# Patient Record
Sex: FEMALE | Race: WHITE | NOT HISPANIC OR LATINO | Employment: OTHER | URBAN - METROPOLITAN AREA
[De-identification: names, ages, dates, MRNs, and addresses within clinical notes are randomized per-mention and may not be internally consistent; named-entity substitution may affect disease eponyms.]

---

## 2023-08-31 PROBLEM — R01.1 MURMUR: Status: ACTIVE | Noted: 2023-08-31

## 2023-08-31 PROBLEM — S29.012A STRAIN OF THORACIC BACK REGION: Status: ACTIVE | Noted: 2023-08-31

## 2023-08-31 PROBLEM — E53.8 VITAMIN B 12 DEFICIENCY: Status: ACTIVE | Noted: 2023-08-31

## 2023-08-31 PROBLEM — E78.2 MIXED HYPERLIPIDEMIA: Status: ACTIVE | Noted: 2023-08-31

## 2023-08-31 PROBLEM — S22.41XA MULTIPLE CLOSED FRACTURES OF RIBS OF RIGHT SIDE: Status: ACTIVE | Noted: 2023-08-31

## 2023-08-31 PROBLEM — E55.9 VITAMIN D DEFICIENCY: Status: ACTIVE | Noted: 2023-08-31

## 2023-08-31 PROBLEM — M17.11 PRIMARY OSTEOARTHRITIS OF RIGHT KNEE: Status: ACTIVE | Noted: 2023-08-31

## 2023-08-31 PROBLEM — F51.01 PRIMARY INSOMNIA: Status: ACTIVE | Noted: 2023-08-31
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
## 2023-08-31 PROBLEM — I10 ESSENTIAL HYPERTENSION: Status: ACTIVE | Noted: 2023-08-31

## 2023-08-31 PROBLEM — Z78.9 MEDICAL HOME PATIENT: Status: ACTIVE | Noted: 2023-08-31

## 2023-08-31 PROBLEM — S72.132D: Status: ACTIVE | Noted: 2023-08-31

## 2023-08-31 PROBLEM — H93.12 TINNITUS OF LEFT EAR: Status: ACTIVE | Noted: 2023-08-31

## 2023-08-31 PROBLEM — M85.852 OSTEOPENIA OF LEFT THIGH: Status: ACTIVE | Noted: 2023-08-31

## 2023-08-31 PROBLEM — N95.1 MENOPAUSAL SYNDROME: Status: ACTIVE | Noted: 2023-08-31

## 2023-08-31 PROBLEM — S42.213A CLOSED FRACTURE OF SURGICAL NECK OF HUMERUS: Status: ACTIVE | Noted: 2023-08-31

## 2023-08-31 PROBLEM — I35.9 AORTIC VALVE CALCIFICATION: Status: ACTIVE | Noted: 2023-08-31

## 2023-08-31 PROBLEM — S69.90XA INJURY OF WRIST: Status: ACTIVE | Noted: 2023-08-31

## 2023-08-31 PROBLEM — M80.00XA AGE-RELATED OSTEOPOROSIS WITH CURRENT PATHOLOGICAL FRACTURE: Status: ACTIVE | Noted: 2023-08-31

## 2023-08-31 PROBLEM — F32.9 REACTIVE DEPRESSION: Status: ACTIVE | Noted: 2023-08-31

## 2023-08-31 PROBLEM — I35.9 AORTIC VALVE DISORDER: Status: ACTIVE | Noted: 2023-08-31

## 2023-08-31 PROBLEM — D47.1 MYELOPROLIFERATIVE DISORDER (MULTI): Status: ACTIVE | Noted: 2023-08-31

## 2023-08-31 PROBLEM — M89.9 DISORDER OF BONE, UNSPECIFIED: Status: ACTIVE | Noted: 2023-08-31

## 2023-08-31 RX ORDER — ATORVASTATIN CALCIUM 10 MG/1
10 TABLET, FILM COATED ORAL DAILY
COMMUNITY
End: 2023-12-04

## 2023-08-31 RX ORDER — ESZOPICLONE 2 MG/1
2 TABLET, FILM COATED ORAL NIGHTLY
COMMUNITY
Start: 2023-02-03

## 2023-08-31 RX ORDER — ATENOLOL 25 MG/1
25 TABLET ORAL DAILY
COMMUNITY
End: 2024-02-27 | Stop reason: SDUPTHER

## 2023-08-31 RX ORDER — ASPIRIN 81 MG/1
81 TABLET ORAL DAILY
COMMUNITY

## 2023-08-31 RX ORDER — ASCORBIC ACID 500 MG
500 TABLET ORAL DAILY
COMMUNITY

## 2023-08-31 RX ORDER — VIT C/E/ZN/COPPR/LUTEIN/ZEAXAN 250MG-90MG
25 CAPSULE ORAL DAILY
COMMUNITY

## 2023-08-31 RX ORDER — ESCITALOPRAM OXALATE 20 MG/1
20 TABLET ORAL DAILY
COMMUNITY
Start: 2023-04-04

## 2023-08-31 RX ORDER — HYDROXYUREA 500 MG/1
500 CAPSULE ORAL DAILY
COMMUNITY
End: 2023-12-08 | Stop reason: SDUPTHER

## 2023-08-31 RX ORDER — TERIPARATIDE 250 UG/ML
INJECTION, SOLUTION SUBCUTANEOUS DAILY
COMMUNITY
Start: 2023-04-04 | End: 2023-11-13 | Stop reason: ALTCHOICE

## 2023-08-31 RX ORDER — LANOLIN ALCOHOL/MO/W.PET/CERES
1000 CREAM (GRAM) TOPICAL DAILY
COMMUNITY

## 2023-09-02 RX ORDER — GABAPENTIN 100 MG/1
CAPSULE ORAL
COMMUNITY
Start: 2023-08-22

## 2023-09-05 ENCOUNTER — HOSPITAL ENCOUNTER (OUTPATIENT)
Dept: DATA CONVERSION | Facility: HOSPITAL | Age: 73
End: 2023-09-05

## 2023-09-05 ENCOUNTER — HOSPITAL ENCOUNTER (OUTPATIENT)
Dept: DATA CONVERSION | Facility: HOSPITAL | Age: 73
Discharge: HOME | End: 2023-09-05
Payer: MEDICARE

## 2023-09-05 DIAGNOSIS — Q25.3 SUPRAVALVULAR AORTIC STENOSIS (HHS-HCC): ICD-10-CM

## 2023-09-05 DIAGNOSIS — Z12.31 ENCOUNTER FOR SCREENING MAMMOGRAM FOR MALIGNANT NEOPLASM OF BREAST: ICD-10-CM

## 2023-09-05 DIAGNOSIS — M80.00XS AGE-RELATED OSTEOPOROSIS WITH CURRENT PATHOLOGICAL FRACTURE, UNSPECIFIED SITE, SEQUELA: ICD-10-CM

## 2023-11-13 ENCOUNTER — APPOINTMENT (OUTPATIENT)
Dept: LAB | Facility: CLINIC | Age: 73
End: 2023-11-13
Payer: MEDICARE

## 2023-11-13 ENCOUNTER — OFFICE VISIT (OUTPATIENT)
Dept: HEMATOLOGY/ONCOLOGY | Facility: CLINIC | Age: 73
End: 2023-11-13
Payer: MEDICARE

## 2023-11-13 VITALS
DIASTOLIC BLOOD PRESSURE: 67 MMHG | SYSTOLIC BLOOD PRESSURE: 124 MMHG | BODY MASS INDEX: 24.71 KG/M2 | OXYGEN SATURATION: 95 % | WEIGHT: 125.88 LBS | HEART RATE: 72 BPM | TEMPERATURE: 95.9 F | RESPIRATION RATE: 17 BRPM | HEIGHT: 60 IN

## 2023-11-13 DIAGNOSIS — D47.3 ESSENTIAL THROMBOCYTOSIS (MULTI): Primary | ICD-10-CM

## 2023-11-13 DIAGNOSIS — E53.8 B12 DEFICIENCY: ICD-10-CM

## 2023-11-13 LAB
ALBUMIN SERPL BCP-MCNC: 4.3 G/DL (ref 3.4–5)
ALP SERPL-CCNC: 42 U/L (ref 33–136)
ALT SERPL W P-5'-P-CCNC: 8 U/L (ref 7–45)
ANION GAP SERPL CALC-SCNC: 13 MMOL/L (ref 10–20)
AST SERPL W P-5'-P-CCNC: 13 U/L (ref 9–39)
BASOPHILS # BLD AUTO: 0.05 X10*3/UL (ref 0–0.1)
BASOPHILS NFR BLD AUTO: 1.4 %
BILIRUB SERPL-MCNC: 0.6 MG/DL (ref 0–1.2)
BUN SERPL-MCNC: 18 MG/DL (ref 6–23)
CALCIUM SERPL-MCNC: 9.4 MG/DL (ref 8.6–10.3)
CHLORIDE SERPL-SCNC: 104 MMOL/L (ref 98–107)
CO2 SERPL-SCNC: 28 MMOL/L (ref 21–32)
CREAT SERPL-MCNC: 0.79 MG/DL (ref 0.5–1.05)
EOSINOPHIL # BLD AUTO: 0.13 X10*3/UL (ref 0–0.4)
EOSINOPHIL NFR BLD AUTO: 3.6 %
ERYTHROCYTE [DISTWIDTH] IN BLOOD BY AUTOMATED COUNT: 15.4 % (ref 11.5–14.5)
GFR SERPL CREATININE-BSD FRML MDRD: 79 ML/MIN/1.73M*2
GLUCOSE SERPL-MCNC: 93 MG/DL (ref 74–99)
HCT VFR BLD AUTO: 37.7 % (ref 36–46)
HGB BLD-MCNC: 12.3 G/DL (ref 12–16)
IMM GRANULOCYTES # BLD AUTO: 0.05 X10*3/UL (ref 0–0.5)
IMM GRANULOCYTES NFR BLD AUTO: 1.4 % (ref 0–0.9)
LYMPHOCYTES # BLD AUTO: 1.18 X10*3/UL (ref 0.8–3)
LYMPHOCYTES NFR BLD AUTO: 33.1 %
MCH RBC QN AUTO: 35.1 PG (ref 26–34)
MCHC RBC AUTO-ENTMCNC: 32.6 G/DL (ref 32–36)
MCV RBC AUTO: 108 FL (ref 80–100)
MONOCYTES # BLD AUTO: 0.29 X10*3/UL (ref 0.05–0.8)
MONOCYTES NFR BLD AUTO: 8.1 %
NEUTROPHILS # BLD AUTO: 1.87 X10*3/UL (ref 1.6–5.5)
NEUTROPHILS NFR BLD AUTO: 52.4 %
NRBC BLD-RTO: 0 /100 WBCS (ref 0–0)
PLATELET # BLD AUTO: 417 X10*3/UL (ref 150–450)
POTASSIUM SERPL-SCNC: 3.8 MMOL/L (ref 3.5–5.3)
PROT SERPL-MCNC: 6.7 G/DL (ref 6.4–8.2)
RBC # BLD AUTO: 3.5 X10*6/UL (ref 4–5.2)
SODIUM SERPL-SCNC: 141 MMOL/L (ref 136–145)
VIT B12 SERPL-MCNC: 1117 PG/ML (ref 211–911)
WBC # BLD AUTO: 3.6 X10*3/UL (ref 4.4–11.3)

## 2023-11-13 PROCEDURE — 99213 OFFICE O/P EST LOW 20 MIN: CPT | Performed by: NURSE PRACTITIONER

## 2023-11-13 PROCEDURE — 82607 VITAMIN B-12: CPT | Performed by: NURSE PRACTITIONER

## 2023-11-13 PROCEDURE — 3074F SYST BP LT 130 MM HG: CPT | Performed by: NURSE PRACTITIONER

## 2023-11-13 PROCEDURE — 36415 COLL VENOUS BLD VENIPUNCTURE: CPT | Performed by: NURSE PRACTITIONER

## 2023-11-13 PROCEDURE — 1160F RVW MEDS BY RX/DR IN RCRD: CPT | Performed by: NURSE PRACTITIONER

## 2023-11-13 PROCEDURE — 85025 COMPLETE CBC W/AUTO DIFF WBC: CPT | Performed by: NURSE PRACTITIONER

## 2023-11-13 PROCEDURE — 1126F AMNT PAIN NOTED NONE PRSNT: CPT | Performed by: NURSE PRACTITIONER

## 2023-11-13 PROCEDURE — 3078F DIAST BP <80 MM HG: CPT | Performed by: NURSE PRACTITIONER

## 2023-11-13 PROCEDURE — 1036F TOBACCO NON-USER: CPT | Performed by: NURSE PRACTITIONER

## 2023-11-13 PROCEDURE — 1159F MED LIST DOCD IN RCRD: CPT | Performed by: NURSE PRACTITIONER

## 2023-11-13 PROCEDURE — 80053 COMPREHEN METABOLIC PANEL: CPT | Performed by: NURSE PRACTITIONER

## 2023-11-13 ASSESSMENT — PATIENT HEALTH QUESTIONNAIRE - PHQ9
2. FEELING DOWN, DEPRESSED OR HOPELESS: NOT AT ALL
1. LITTLE INTEREST OR PLEASURE IN DOING THINGS: NOT AT ALL
SUM OF ALL RESPONSES TO PHQ9 QUESTIONS 1 AND 2: 0

## 2023-11-13 ASSESSMENT — ENCOUNTER SYMPTOMS
LOSS OF SENSATION IN FEET: 0
OCCASIONAL FEELINGS OF UNSTEADINESS: 0
DEPRESSION: 0

## 2023-11-13 ASSESSMENT — PAIN SCALES - GENERAL: PAINLEVEL: 0-NO PAIN

## 2023-11-13 ASSESSMENT — COLUMBIA-SUICIDE SEVERITY RATING SCALE - C-SSRS
2. HAVE YOU ACTUALLY HAD ANY THOUGHTS OF KILLING YOURSELF?: NO
1. IN THE PAST MONTH, HAVE YOU WISHED YOU WERE DEAD OR WISHED YOU COULD GO TO SLEEP AND NOT WAKE UP?: NO
6. HAVE YOU EVER DONE ANYTHING, STARTED TO DO ANYTHING, OR PREPARED TO DO ANYTHING TO END YOUR LIFE?: NO

## 2023-11-13 NOTE — PROGRESS NOTES
"Patient ID: Victoria Trotter is a 73 y.o. female.    History of Present Illness:  Patient returns today for routine follow up evaluation for history of essential thrombocytosis diagnosed by Vipul Ray M.D., bone marrow biopsy in , JAK2 gene mutation negative, currently well controlled with hydroxyurea.   Macrocytosis with history of vitamin B12 deficiency. Remains on oral vitamin B12. Had previously been on monthly B12 injections in our clinic.   Patient remains on hydroxyurea 500 mg daily. Although she has been out of medication for the past week.   This is overall kept her counts stable.  She has not had any fever, chills or night sweats.  No cough, chest pain or shortness of breath.  No nausea or vomiting.  No constipation or diarrhea. No VTE. No signs or symptoms of active bleeding or unusual bruising.     Fall in 2022. Resulting in broken left femur she underwent edgardo and stabilizer placement.    Imaging also noted instability of right femur and edgardo was placed in right femur as well.  She spent 2 weeks in rehabilitation at [facility].     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  She is also caring for her  with COPD status post MI x2.    Vital Signs:  /67 (BP Location: Right arm, Patient Position: Sitting, BP Cuff Size: Adult)   Pulse 72   Temp 35.5 °C (95.9 °F) (Temporal)   Resp 17   Ht 1.513 m (4' 11.57\")   Wt 57.1 kg (125 lb 14.1 oz)   SpO2 95%   BMI 24.94 kg/m²     Physical Exam:  ECO  Pain: 1 (chronic joint pain)  Constitutional: Well developed, awake/alert/oriented x3, no distress, alert and cooperative  Eyes: PER. sclera anicteric  ENMT: Oral mucosa " moist  Respiratory/Thorax: Breathing is non-labored.   Cardiovascular: S1-S2. Regular rate and rhythm. No murmurs, rubs, or gallops appreciated  Musculoskeletal: ROM intact, no joint swelling, normal strength  Extremities: normal extremities, no cyanosis, no edema, no clubbing  Neurologic: alert and oriented x3. Nonfocal exam. No myoclonus  Psychological: Pleasant, appropriate and easily engaged     Labs:  CBC date/time       WBC     HGB     HCT     PLT     Neut      10-May-2023 08:59   4.7     12.4    37.7    510(H)  2.48  14-Nov-2022 08:59   4.6     12.7    38.4    482(H)  2.64    November 13, 2023  WBC 3.6, hemoglobin 12.3, hematocrit 37.7, platelets 417,000    Assessment:  Myeloproliferative disorder (essential thrombocytosis) :  Patient will continue hydroxyurea 500 mg daily and aspirin 81 mg daily    Vitamin B12 deficiency:   She will continue to take oral B12.    Immune prophylaxis:   The patient will continue with the flu shot every fall and the Pneumovax every five years.  She is planning to get her shingles vaccine. She has gotten her COVID booster.     Plan:  - labs 3 months and 6 months   - 6 month follow-up visit  - CBCd 3 months (following WBC as slightly low today)  - CBCd, CMP and LDH    COCO Camarillo-CNP

## 2023-11-13 NOTE — PROGRESS NOTES
Patient here for follow up with Stan Colbert.     Medications and allergies reviewed.     Patient will need refill on hydrea today.     No complaints of pain at this time.

## 2023-12-02 DIAGNOSIS — E78.2 MIXED HYPERLIPIDEMIA: Primary | ICD-10-CM

## 2023-12-04 RX ORDER — ATORVASTATIN CALCIUM 10 MG/1
10 TABLET, FILM COATED ORAL DAILY
Qty: 100 TABLET | Refills: 1 | Status: SHIPPED | OUTPATIENT
Start: 2023-12-04 | End: 2024-05-20

## 2023-12-08 ENCOUNTER — TELEPHONE (OUTPATIENT)
Dept: HEMATOLOGY/ONCOLOGY | Facility: CLINIC | Age: 73
End: 2023-12-08
Payer: MEDICARE

## 2023-12-08 ENCOUNTER — TELEPHONE (OUTPATIENT)
Dept: HEMATOLOGY/ONCOLOGY | Facility: HOSPITAL | Age: 73
End: 2023-12-08
Payer: MEDICARE

## 2023-12-08 DIAGNOSIS — D47.3 ESSENTIAL THROMBOCYTOSIS (MULTI): Primary | ICD-10-CM

## 2023-12-08 RX ORDER — HYDROXYUREA 500 MG/1
500 CAPSULE ORAL DAILY
Qty: 90 CAPSULE | Refills: 3 | Status: SHIPPED | OUTPATIENT
Start: 2023-12-08 | End: 2024-05-07 | Stop reason: SDUPTHER

## 2023-12-08 NOTE — TELEPHONE ENCOUNTER
Refill request received for Hydroxyurea.  This is a pt seen at [facility] By Stan Colbert CNP.  Forwarded message to [facility].

## 2023-12-11 ENCOUNTER — APPOINTMENT (OUTPATIENT)
Dept: PRIMARY CARE | Facility: CLINIC | Age: 73
End: 2023-12-11
Payer: MEDICARE

## 2023-12-20 ENCOUNTER — APPOINTMENT (OUTPATIENT)
Dept: PRIMARY CARE | Facility: CLINIC | Age: 73
End: 2023-12-20
Payer: MEDICARE

## 2024-01-03 ENCOUNTER — HOSPITAL ENCOUNTER (OUTPATIENT)
Dept: CARDIOLOGY | Facility: HOSPITAL | Age: 74
Discharge: HOME | End: 2024-01-03
Payer: MEDICARE

## 2024-01-03 DIAGNOSIS — R01.1 CARDIAC MURMUR, UNSPECIFIED: ICD-10-CM

## 2024-01-03 DIAGNOSIS — I35.0 AORTIC VALVE STENOSIS, ETIOLOGY OF CARDIAC VALVE DISEASE UNSPECIFIED: ICD-10-CM

## 2024-01-03 LAB
AORTIC VALVE MEAN GRADIENT: 25
AORTIC VALVE PEAK VELOCITY: 3.06
AV PEAK GRADIENT: 37.5
AVA (PEAK VEL): 0.82
AVA (VTI): 0.65
EJECTION FRACTION APICAL 4 CHAMBER: 58.3
EJECTION FRACTION: 60
LEFT ATRIUM VOLUME AREA LENGTH INDEX BSA: 21.3
LEFT VENTRICLE INTERNAL DIMENSION DIASTOLE: 4.38 (ref 3.5–6)
LEFT VENTRICULAR OUTFLOW TRACT DIAMETER: 2
MITRAL VALVE E/A RATIO: 0.63
MITRAL VALVE E/E' RATIO: 9.26
RIGHT VENTRICLE FREE WALL PEAK S': 10.2
RIGHT VENTRICLE PEAK SYSTOLIC PRESSURE: 25.1
TRICUSPID ANNULAR PLANE SYSTOLIC EXCURSION: 1.7

## 2024-01-03 PROCEDURE — 93306 TTE W/DOPPLER COMPLETE: CPT

## 2024-01-03 PROCEDURE — 93306 TTE W/DOPPLER COMPLETE: CPT | Performed by: INTERNAL MEDICINE

## 2024-01-15 NOTE — PROGRESS NOTES
Subjective      Chief Complaint   Patient presents with    6 MONTH FOLLOW UP     Aortic stenosis          An echocardiogram was done on 1/3/2024 showed that the aortic valve had moderate stenosis with a peak gradient of 37 mmHg with mean gradient of 25 mmHg. n 2022 the gradients were 37 mmHg and 25 mmHg respectively.  The left ventricular function is normal.  She is feeling well and is active  She is not complaining of chest discomfort.  No complaints of PND or orthopnea.  The legs are not swelling on her.  NO palpitaions.  The BP is doing well  The chol was done in June and is good           ROS     History reviewed. No pertinent surgical history.     Active Ambulatory Problems     Diagnosis Date Noted    Medical home patient 08/31/2023    Accidental fall 08/31/2023    Age-related osteoporosis with current pathological fracture 08/31/2023    Aortic valve calcification 08/31/2023    Aortic valve disorder 08/31/2023    Closed fracture of surgical neck of humerus 08/31/2023    Disorder of bone, unspecified 08/31/2023    Displaced apophyseal fracture of left femur, subsequent encounter for closed fracture with routine healing 08/31/2023    Essential hypertension 08/31/2023    Injury of wrist 08/31/2023    Menopausal syndrome 08/31/2023    Mixed hyperlipidemia 08/31/2023    Multiple closed fractures of ribs of right side 08/31/2023    Murmur 08/31/2023    Myeloproliferative disorder (CMS/HCC) 08/31/2023    Osteopenia of left thigh 08/31/2023    Primary insomnia 08/31/2023    Primary osteoarthritis of right knee 08/31/2023    Reactive depression 08/31/2023    Strain of thoracic back region 08/31/2023    Tinnitus of left ear 08/31/2023    Vitamin B 12 deficiency 08/31/2023    Vitamin D deficiency 08/31/2023     Resolved Ambulatory Problems     Diagnosis Date Noted    No Resolved Ambulatory Problems     Past Medical History:   Diagnosis Date    High cholesterol     Hypertension         Visit Vitals  /79   Pulse 72  "  Wt 58.1 kg (128 lb)   SpO2 97%   BMI 25.36 kg/m²   Smoking Status Former   BSA 1.56 m²        Objective     Cardiovascular:      PMI at left midclavicular line. Normal rate. Regular rhythm. Normal S1. Normal S2.       Murmurs: There is a grade 2/6 harsh systolic murmur.      No gallop.  No click. No rub.   Pulses:     Intact distal pulses.            Lab Review:         Lab Results   Component Value Date    CHOL 188 06/13/2023    CHOL 218 (H) 06/07/2022    CHOL 208 (H) 06/04/2021     Lab Results   Component Value Date    HDL 67 06/13/2023    HDL 68 06/07/2022    HDL 69 06/04/2021     Lab Results   Component Value Date    LDLCALC 93 06/13/2023    LDLCALC 116 06/07/2022    LDLCALC 104 06/04/2021     Lab Results   Component Value Date    TRIG 142 06/13/2023    TRIG 170 (H) 06/07/2022    TRIG 177 (H) 06/04/2021     No components found for: \"CHOLHDL\"     Assessment/Plan     Aortic valve disorder  Is doing well and no angina and no chf.  The echo shows the valve shows has unchanged    Essential hypertension  Is doing well    Mixed hyperlipidemia  Was checked in June and is low     "

## 2024-01-17 ENCOUNTER — OFFICE VISIT (OUTPATIENT)
Dept: CARDIOLOGY | Facility: CLINIC | Age: 74
End: 2024-01-17
Payer: MEDICARE

## 2024-01-17 VITALS
WEIGHT: 128 LBS | OXYGEN SATURATION: 97 % | BODY MASS INDEX: 25.36 KG/M2 | SYSTOLIC BLOOD PRESSURE: 124 MMHG | HEART RATE: 72 BPM | DIASTOLIC BLOOD PRESSURE: 79 MMHG

## 2024-01-17 DIAGNOSIS — I35.9 AORTIC VALVE DISORDER: Primary | ICD-10-CM

## 2024-01-17 DIAGNOSIS — I10 ESSENTIAL HYPERTENSION: ICD-10-CM

## 2024-01-17 DIAGNOSIS — E78.2 MIXED HYPERLIPIDEMIA: ICD-10-CM

## 2024-01-17 PROCEDURE — 99213 OFFICE O/P EST LOW 20 MIN: CPT | Performed by: INTERNAL MEDICINE

## 2024-01-17 PROCEDURE — 3078F DIAST BP <80 MM HG: CPT | Performed by: INTERNAL MEDICINE

## 2024-01-17 PROCEDURE — 3074F SYST BP LT 130 MM HG: CPT | Performed by: INTERNAL MEDICINE

## 2024-01-17 PROCEDURE — 1159F MED LIST DOCD IN RCRD: CPT | Performed by: INTERNAL MEDICINE

## 2024-01-17 PROCEDURE — 1160F RVW MEDS BY RX/DR IN RCRD: CPT | Performed by: INTERNAL MEDICINE

## 2024-01-17 PROCEDURE — 1126F AMNT PAIN NOTED NONE PRSNT: CPT | Performed by: INTERNAL MEDICINE

## 2024-01-17 PROCEDURE — 1036F TOBACCO NON-USER: CPT | Performed by: INTERNAL MEDICINE

## 2024-01-17 ASSESSMENT — ENCOUNTER SYMPTOMS
OCCASIONAL FEELINGS OF UNSTEADINESS: 0
DEPRESSION: 0
LOSS OF SENSATION IN FEET: 0

## 2024-01-17 ASSESSMENT — PATIENT HEALTH QUESTIONNAIRE - PHQ9
SUM OF ALL RESPONSES TO PHQ9 QUESTIONS 1 AND 2: 0
1. LITTLE INTEREST OR PLEASURE IN DOING THINGS: NOT AT ALL
2. FEELING DOWN, DEPRESSED OR HOPELESS: NOT AT ALL

## 2024-01-17 ASSESSMENT — PAIN SCALES - GENERAL: PAINLEVEL: 0-NO PAIN

## 2024-01-19 ENCOUNTER — APPOINTMENT (OUTPATIENT)
Dept: PRIMARY CARE | Facility: CLINIC | Age: 74
End: 2024-01-19
Payer: MEDICARE

## 2024-01-26 ENCOUNTER — OFFICE VISIT (OUTPATIENT)
Dept: PRIMARY CARE | Facility: CLINIC | Age: 74
End: 2024-01-26
Payer: MEDICARE

## 2024-01-26 VITALS
OXYGEN SATURATION: 99 % | SYSTOLIC BLOOD PRESSURE: 118 MMHG | WEIGHT: 124.2 LBS | BODY MASS INDEX: 24.61 KG/M2 | HEART RATE: 64 BPM | DIASTOLIC BLOOD PRESSURE: 80 MMHG

## 2024-01-26 DIAGNOSIS — I10 ESSENTIAL HYPERTENSION: Primary | ICD-10-CM

## 2024-01-26 DIAGNOSIS — F32.9 REACTIVE DEPRESSION: ICD-10-CM

## 2024-01-26 DIAGNOSIS — E78.2 MIXED HYPERLIPIDEMIA: ICD-10-CM

## 2024-01-26 DIAGNOSIS — E53.8 VITAMIN B 12 DEFICIENCY: ICD-10-CM

## 2024-01-26 DIAGNOSIS — E55.9 VITAMIN D DEFICIENCY: ICD-10-CM

## 2024-01-26 PROCEDURE — 1036F TOBACCO NON-USER: CPT

## 2024-01-26 PROCEDURE — 1160F RVW MEDS BY RX/DR IN RCRD: CPT

## 2024-01-26 PROCEDURE — 1157F ADVNC CARE PLAN IN RCRD: CPT

## 2024-01-26 PROCEDURE — 1159F MED LIST DOCD IN RCRD: CPT

## 2024-01-26 PROCEDURE — 99214 OFFICE O/P EST MOD 30 MIN: CPT

## 2024-01-26 PROCEDURE — 1126F AMNT PAIN NOTED NONE PRSNT: CPT

## 2024-01-26 PROCEDURE — 3074F SYST BP LT 130 MM HG: CPT

## 2024-01-26 PROCEDURE — 3079F DIAST BP 80-89 MM HG: CPT

## 2024-01-26 ASSESSMENT — LIFESTYLE VARIABLES
HOW OFTEN DO YOU HAVE SIX OR MORE DRINKS ON ONE OCCASION: NEVER
HOW MANY STANDARD DRINKS CONTAINING ALCOHOL DO YOU HAVE ON A TYPICAL DAY: PATIENT DOES NOT DRINK

## 2024-01-26 ASSESSMENT — PATIENT HEALTH QUESTIONNAIRE - PHQ9
1. LITTLE INTEREST OR PLEASURE IN DOING THINGS: NOT AT ALL
SUM OF ALL RESPONSES TO PHQ9 QUESTIONS 1 AND 2: 0
2. FEELING DOWN, DEPRESSED OR HOPELESS: NOT AT ALL

## 2024-01-26 ASSESSMENT — PAIN SCALES - GENERAL: PAINLEVEL: 0-NO PAIN

## 2024-01-26 ASSESSMENT — ENCOUNTER SYMPTOMS
OCCASIONAL FEELINGS OF UNSTEADINESS: 0
DEPRESSION: 0
LOSS OF SENSATION IN FEET: 0

## 2024-01-26 NOTE — PATIENT INSTRUCTIONS
Decrease intake of saturated fats, fast food, sweets.  Increase intake of fresh fruit fresh vegetables and lean meats.  Increase healthy fats seeds, nuts, olive oil instead of butter.  walk 150 minutes/week for heart health.  Decrease intake of processed foods, fast foods, lunch meat, canned soups, canned veggies.  Increase intake of fresh fruits, veggies, and lean meats. Monitor blood pressure at home, keep a log and bring this with you to your next appointment. Call the office if your blood pressure runs 150/90 or higher consistently.

## 2024-01-26 NOTE — PROGRESS NOTES
Subjective   Patient ID: Victoria Trotter is a 73 y.o. female who presents for Establish Care.    HPI   Patient is here to establish care former CN patient.  She does see Dr. Deluna for injections in her left knee.  She did see Dr. Perry on the 17th due to follow-up with him in 1 year.  She denies any issues with chest pain, chest pressure, shortness of breath, constipation, diarrhea, blood in stool, urinary urgency, frequency, blood in urine, muscle weakness in arms and legs, numbness and tingling in fingers or toes.  She does not monitor blood pressure at home.  She denies any urgent care, ER, hospitalization, new diagnoses or surgeries since she was here last.  Review of Systems  Review of Systems negative except as noted in HPI and Chief complaint.    Current Outpatient Medications:     ascorbic acid (Vitamin C) 500 mg tablet, Take 1 tablet (500 mg) by mouth once daily., Disp: , Rfl:     aspirin (Lo-Dose Aspirin) 81 mg EC tablet, Take 1 tablet (81 mg) by mouth once daily., Disp: , Rfl:     atenolol (Tenormin) 25 mg tablet, Take 1 tablet (25 mg) by mouth once daily., Disp: , Rfl:     atorvastatin (Lipitor) 10 mg tablet, TAKE 1 TABLET BY MOUTH ONCE  DAILY, Disp: 100 tablet, Rfl: 1    cholecalciferol (Vitamin D3) 25 MCG (1000 UT) capsule, Take 1 capsule (25 mcg) by mouth once daily., Disp: , Rfl:     cyanocobalamin (Vitamin B-12) 1,000 mcg tablet, 1 tablet (1,000 mcg) once daily., Disp: , Rfl:     escitalopram (Lexapro) 20 mg tablet, Take 1 tablet (20 mg) by mouth once daily., Disp: , Rfl:     eszopiclone (Lunesta) 2 mg tablet, Take 1 tablet (2 mg) by mouth once daily at bedtime. As needed, Disp: , Rfl:     gabapentin (Neurontin) 100 mg capsule, , Disp: , Rfl:     hydroxyurea (Hydrea) 500 mg capsule, Take 1 capsule (500 mg total) by mouth once daily., Disp: 90 capsule, Rfl: 3      Objective   /80   Pulse 64   Wt 56.3 kg (124 lb 3.2 oz)   SpO2 99%   BMI 24.61 kg/m²     Physical Exam  Vitals reviewed.    Cardiovascular:      Rate and Rhythm: Normal rate and regular rhythm.   Pulmonary:      Effort: Pulmonary effort is normal.   Musculoskeletal:         General: Normal range of motion.   Neurological:      General: No focal deficit present.      Mental Status: She is alert and oriented to person, place, and time. Mental status is at baseline.   Psychiatric:         Mood and Affect: Mood normal.         Behavior: Behavior normal.         Thought Content: Thought content normal.         Judgment: Judgment normal.       Assessment/Plan   Diagnoses and all orders for this visit:  Essential hypertension  Mixed hyperlipidemia  Vitamin B 12 deficiency  Vitamin D deficiency  Reactive depression    Decrease intake of saturated fats, fast food, sweets.  Increase intake of fresh fruit fresh vegetables and lean meats.  Increase healthy fats seeds, nuts, olive oil instead of butter.  walk 150 minutes/week for heart health.  Decrease intake of processed foods, fast foods, lunch meat, canned soups, canned veggies.  Increase intake of fresh fruits, veggies, and lean meats. Monitor blood pressure at home, keep a log and bring this with you to your next appointment. Call the office if your blood pressure runs 150/90 or higher consistently.  Advanced care planning was discussed with Victoria Trotter today. We reviewed code status, Medical Power of , and Living will.   Pt has lw and poa  *This note was dictated using DRAGON speech recognition software and was corrected for spelling or grammatical errors, but despite proofreading several typographical errors might be present that might affect the meaning of the content.*  Mimi Marlow, CNP

## 2024-02-20 ENCOUNTER — TELEPHONE (OUTPATIENT)
Dept: HEMATOLOGY/ONCOLOGY | Facility: CLINIC | Age: 74
End: 2024-02-20

## 2024-02-20 ENCOUNTER — LAB (OUTPATIENT)
Dept: LAB | Facility: LAB | Age: 74
End: 2024-02-20
Payer: MEDICARE

## 2024-02-20 DIAGNOSIS — D47.3 ESSENTIAL THROMBOCYTOSIS (MULTI): ICD-10-CM

## 2024-02-20 LAB
BASOPHILS # BLD MANUAL: 0.09 X10*3/UL (ref 0–0.1)
BASOPHILS NFR BLD MANUAL: 2 %
EOSINOPHIL # BLD MANUAL: 0.23 X10*3/UL (ref 0–0.4)
EOSINOPHIL NFR BLD MANUAL: 5 %
ERYTHROCYTE [DISTWIDTH] IN BLOOD BY AUTOMATED COUNT: 15.6 % (ref 11.5–14.5)
HCT VFR BLD AUTO: 38.6 % (ref 36–46)
HGB BLD-MCNC: 12.4 G/DL (ref 12–16)
IMM GRANULOCYTES # BLD AUTO: 0.07 X10*3/UL (ref 0–0.5)
IMM GRANULOCYTES NFR BLD AUTO: 1.6 % (ref 0–0.9)
LYMPHOCYTES # BLD MANUAL: 1.58 X10*3/UL (ref 0.8–3)
LYMPHOCYTES NFR BLD MANUAL: 35 %
MCH RBC QN AUTO: 34.7 PG (ref 26–34)
MCHC RBC AUTO-ENTMCNC: 32.1 G/DL (ref 32–36)
MCV RBC AUTO: 108 FL (ref 80–100)
METAMYELOCYTES # BLD MANUAL: 0.05 X10*3/UL
METAMYELOCYTES NFR BLD MANUAL: 1 %
MONOCYTES # BLD MANUAL: 0.23 X10*3/UL (ref 0.05–0.8)
MONOCYTES NFR BLD MANUAL: 5 %
NEUTROPHILS # BLD MANUAL: 2.34 X10*3/UL (ref 1.6–5.5)
NEUTS BAND # BLD MANUAL: 0.09 X10*3/UL (ref 0–0.5)
NEUTS BAND NFR BLD MANUAL: 2 %
NEUTS SEG # BLD MANUAL: 2.25 X10*3/UL (ref 1.6–5)
NEUTS SEG NFR BLD MANUAL: 50 %
NRBC BLD-RTO: 0 /100 WBCS (ref 0–0)
OVALOCYTES BLD QL SMEAR: NORMAL
PLATELET # BLD AUTO: 442 X10*3/UL (ref 150–450)
RBC # BLD AUTO: 3.57 X10*6/UL (ref 4–5.2)
RBC MORPH BLD: NORMAL
TOTAL CELLS COUNTED BLD: 100
WBC # BLD AUTO: 4.5 X10*3/UL (ref 4.4–11.3)

## 2024-02-20 PROCEDURE — 85027 COMPLETE CBC AUTOMATED: CPT

## 2024-02-20 PROCEDURE — 36415 COLL VENOUS BLD VENIPUNCTURE: CPT

## 2024-02-20 PROCEDURE — 85007 BL SMEAR W/DIFF WBC COUNT: CPT

## 2024-02-20 NOTE — TELEPHONE ENCOUNTER
Called and left voicemail on identifiable voicemail that Stan saw her blood work and her platelets were 442 and there will be no change to her hydrea dose.

## 2024-02-27 DIAGNOSIS — I10 ESSENTIAL HYPERTENSION: Primary | ICD-10-CM

## 2024-02-27 RX ORDER — ATENOLOL 25 MG/1
25 TABLET ORAL DAILY
Qty: 90 TABLET | Refills: 1 | Status: SHIPPED | OUTPATIENT
Start: 2024-02-27 | End: 2024-05-07

## 2024-05-06 DIAGNOSIS — I10 ESSENTIAL HYPERTENSION: ICD-10-CM

## 2024-05-07 ENCOUNTER — OFFICE VISIT (OUTPATIENT)
Dept: HEMATOLOGY/ONCOLOGY | Facility: CLINIC | Age: 74
End: 2024-05-07
Payer: MEDICARE

## 2024-05-07 ENCOUNTER — APPOINTMENT (OUTPATIENT)
Dept: LAB | Facility: CLINIC | Age: 74
End: 2024-05-07
Payer: MEDICARE

## 2024-05-07 VITALS
TEMPERATURE: 97.3 F | BODY MASS INDEX: 24.65 KG/M2 | SYSTOLIC BLOOD PRESSURE: 104 MMHG | WEIGHT: 125.55 LBS | HEIGHT: 60 IN | HEART RATE: 69 BPM | DIASTOLIC BLOOD PRESSURE: 68 MMHG | RESPIRATION RATE: 18 BRPM | OXYGEN SATURATION: 98 %

## 2024-05-07 DIAGNOSIS — D47.3 ESSENTIAL THROMBOCYTOSIS (MULTI): Primary | ICD-10-CM

## 2024-05-07 DIAGNOSIS — E53.8 B12 DEFICIENCY: ICD-10-CM

## 2024-05-07 LAB
ALBUMIN SERPL BCP-MCNC: 4.4 G/DL (ref 3.4–5)
ALP SERPL-CCNC: 47 U/L (ref 33–136)
ALT SERPL W P-5'-P-CCNC: 8 U/L (ref 7–45)
ANION GAP SERPL CALC-SCNC: 13 MMOL/L (ref 10–20)
AST SERPL W P-5'-P-CCNC: 13 U/L (ref 9–39)
BASO STIPL BLD QL SMEAR: PRESENT
BASOPHILS # BLD MANUAL: 0.13 X10*3/UL (ref 0–0.1)
BASOPHILS NFR BLD MANUAL: 3 %
BILIRUB SERPL-MCNC: 0.5 MG/DL (ref 0–1.2)
BUN SERPL-MCNC: 20 MG/DL (ref 6–23)
CALCIUM SERPL-MCNC: 9.1 MG/DL (ref 8.6–10.3)
CHLORIDE SERPL-SCNC: 105 MMOL/L (ref 98–107)
CO2 SERPL-SCNC: 26 MMOL/L (ref 21–32)
CREAT SERPL-MCNC: 0.7 MG/DL (ref 0.5–1.05)
EGFRCR SERPLBLD CKD-EPI 2021: >90 ML/MIN/1.73M*2
EOSINOPHIL # BLD MANUAL: 0.17 X10*3/UL (ref 0–0.4)
EOSINOPHIL NFR BLD MANUAL: 4 %
ERYTHROCYTE [DISTWIDTH] IN BLOOD BY AUTOMATED COUNT: 16.4 % (ref 11.5–14.5)
GLUCOSE SERPL-MCNC: 93 MG/DL (ref 74–99)
HCT VFR BLD AUTO: 39.9 % (ref 36–46)
HGB BLD-MCNC: 12.7 G/DL (ref 12–16)
HYPOCHROMIA BLD QL SMEAR: ABNORMAL
IMM GRANULOCYTES # BLD AUTO: 0.42 X10*3/UL (ref 0–0.5)
IMM GRANULOCYTES NFR BLD AUTO: 9.9 % (ref 0–0.9)
LYMPHOCYTES # BLD MANUAL: 1.16 X10*3/UL (ref 0.8–3)
LYMPHOCYTES NFR BLD MANUAL: 27 %
MCH RBC QN AUTO: 34.8 PG (ref 26–34)
MCHC RBC AUTO-ENTMCNC: 31.8 G/DL (ref 32–36)
MCV RBC AUTO: 109 FL (ref 80–100)
METAMYELOCYTES # BLD MANUAL: 0.04 X10*3/UL
METAMYELOCYTES NFR BLD MANUAL: 1 %
MONOCYTES # BLD MANUAL: 0.26 X10*3/UL (ref 0.05–0.8)
MONOCYTES NFR BLD MANUAL: 6 %
NEUTS SEG # BLD MANUAL: 2.54 X10*3/UL (ref 1.6–5)
NEUTS SEG NFR BLD MANUAL: 59 %
NRBC BLD-RTO: 0 /100 WBCS (ref 0–0)
OVALOCYTES BLD QL SMEAR: ABNORMAL
PLATELET # BLD AUTO: 466 X10*3/UL (ref 150–450)
POTASSIUM SERPL-SCNC: 4 MMOL/L (ref 3.5–5.3)
PROT SERPL-MCNC: 6.8 G/DL (ref 6.4–8.2)
RBC # BLD AUTO: 3.65 X10*6/UL (ref 4–5.2)
RBC MORPH BLD: ABNORMAL
SCHISTOCYTES BLD QL SMEAR: ABNORMAL
SODIUM SERPL-SCNC: 140 MMOL/L (ref 136–145)
TOTAL CELLS COUNTED BLD: 100
VIT B12 SERPL-MCNC: 453 PG/ML (ref 211–911)
WBC # BLD AUTO: 4.3 X10*3/UL (ref 4.4–11.3)

## 2024-05-07 PROCEDURE — 84075 ASSAY ALKALINE PHOSPHATASE: CPT | Performed by: NURSE PRACTITIONER

## 2024-05-07 PROCEDURE — 3078F DIAST BP <80 MM HG: CPT | Performed by: NURSE PRACTITIONER

## 2024-05-07 PROCEDURE — 99213 OFFICE O/P EST LOW 20 MIN: CPT | Performed by: NURSE PRACTITIONER

## 2024-05-07 PROCEDURE — 85027 COMPLETE CBC AUTOMATED: CPT | Performed by: NURSE PRACTITIONER

## 2024-05-07 PROCEDURE — 1159F MED LIST DOCD IN RCRD: CPT | Performed by: NURSE PRACTITIONER

## 2024-05-07 PROCEDURE — 85007 BL SMEAR W/DIFF WBC COUNT: CPT | Performed by: NURSE PRACTITIONER

## 2024-05-07 PROCEDURE — 82607 VITAMIN B-12: CPT | Performed by: NURSE PRACTITIONER

## 2024-05-07 PROCEDURE — 83615 LACTATE (LD) (LDH) ENZYME: CPT | Performed by: NURSE PRACTITIONER

## 2024-05-07 PROCEDURE — 1160F RVW MEDS BY RX/DR IN RCRD: CPT | Performed by: NURSE PRACTITIONER

## 2024-05-07 PROCEDURE — 36415 COLL VENOUS BLD VENIPUNCTURE: CPT | Performed by: NURSE PRACTITIONER

## 2024-05-07 PROCEDURE — 1126F AMNT PAIN NOTED NONE PRSNT: CPT | Performed by: NURSE PRACTITIONER

## 2024-05-07 PROCEDURE — 1157F ADVNC CARE PLAN IN RCRD: CPT | Performed by: NURSE PRACTITIONER

## 2024-05-07 PROCEDURE — 3074F SYST BP LT 130 MM HG: CPT | Performed by: NURSE PRACTITIONER

## 2024-05-07 RX ORDER — ATENOLOL 25 MG/1
25 TABLET ORAL DAILY
Qty: 100 TABLET | Refills: 2 | Status: SHIPPED | OUTPATIENT
Start: 2024-05-07

## 2024-05-07 RX ORDER — HYDROXYUREA 500 MG/1
500 CAPSULE ORAL DAILY
Qty: 90 CAPSULE | Refills: 3 | Status: SHIPPED | OUTPATIENT
Start: 2024-05-07 | End: 2025-05-07

## 2024-05-07 ASSESSMENT — COLUMBIA-SUICIDE SEVERITY RATING SCALE - C-SSRS
6. HAVE YOU EVER DONE ANYTHING, STARTED TO DO ANYTHING, OR PREPARED TO DO ANYTHING TO END YOUR LIFE?: NO
1. IN THE PAST MONTH, HAVE YOU WISHED YOU WERE DEAD OR WISHED YOU COULD GO TO SLEEP AND NOT WAKE UP?: NO
2. HAVE YOU ACTUALLY HAD ANY THOUGHTS OF KILLING YOURSELF?: NO

## 2024-05-07 ASSESSMENT — PATIENT HEALTH QUESTIONNAIRE - PHQ9
2. FEELING DOWN, DEPRESSED OR HOPELESS: NOT AT ALL
SUM OF ALL RESPONSES TO PHQ9 QUESTIONS 1 AND 2: 0
1. LITTLE INTEREST OR PLEASURE IN DOING THINGS: NOT AT ALL

## 2024-05-07 ASSESSMENT — PAIN SCALES - GENERAL: PAINLEVEL: 0-NO PAIN

## 2024-05-07 NOTE — PROGRESS NOTES
Patient here for follow up for thrombocytosis with Stan Colbert. Patient states that she has felt some fatigue lately and has been having difficulty sleeping. Patient has no other complaints or questions at this time.     Per orders patient to follow up in 6 months, with labs at 3 months and 6 months.     Patient verbalized understanding.

## 2024-05-07 NOTE — PROGRESS NOTES
"Patient ID: Victoria Trotter is a 73 y.o. female.    Hematologic History:  Essential Thrombocytosis, JAK2 negative     History of Present Illness:  Patient returns today for routine follow up evaluation for history of essential thrombocytosis diagnosed by Vipul Ray M.D., bone marrow biopsy in 2004, JAK2 gene mutation negative, currently well controlled with hydroxyurea.   Macrocytosis with history of vitamin B12 deficiency. Remains on oral vitamin B12. Had previously been on monthly B12 injections in our clinic.   Patient remains on hydroxyurea 500 mg daily. Although she has been out of medication for the past week.   This is overall kept her counts stable.  She has not had any fever, chills or night sweats.  No cough, chest pain or shortness of breath.  No nausea or vomiting.  No constipation or diarrhea. No VTE. No signs or symptoms of active bleeding or unusual bruising.     Review of Systems:  A review of systems has been completed and are negative for complaints except what is stated in the HPI and/or past medical history    Allergies:  NKDA    Medications:  Hydrea 500mg daily, vitamin B12 1000mcg daily   Remainder of medication list reviewed with patient and updated in EMR    Past Medical History:  HTN, hyperlipidemia, murmur, vitamin B12 deficiency, essential thrombocytosis    Past Surgical History:  Fall in February 2022. Resulting in broken left femur she underwent edgardo and stabilizer placement.  Imaging also noted instability of right femur and edgardo was placed in right femur as well.     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  She is also caring for her  with COPD status post MI x2.    Vital Signs:  /68 (BP Location: Right arm, Patient Position: Sitting, BP Cuff Size: Small adult)   Pulse 69   Temp 36.3 °C (97.3 °F) (Temporal)   Resp 18   Ht (S) 1.515 m (4' 11.65\")   Wt 56.9 kg (125 lb 8.8 oz)   SpO2 98%   BMI 24.81 kg/m² "       Physical Exam:  ECO  Pain: 0  Constitutional: Well developed, awake/alert/oriented x3, no distress, alert and cooperative  Eyes: PER. sclera anicteric  ENMT: Oral mucosa moist  Respiratory/Thorax: Breathing is non-labored. Lungs clear to ausculation bilaterally   Cardiovascular: S1-S2. Regular rate and rhythm. + murmur, no rubs, or gallops appreciated  Musculoskeletal: ROM intact, no joint swelling, normal strength  Extremities: normal extremities, no cyanosis, no edema, no clubbing  Neurologic: alert and oriented x3. Nonfocal exam. No myoclonus  Psychological: Pleasant, appropriate and easily engaged     Labs:  2023  WBC 3.6, hemoglobin 12.3, hematocrit 37.7, platelets 417,000    February   WBC 4.5, hemoglobin 12.4, hematocrit 38.6, platelets 442,000    May 7, 2024  WBC 4.3, hemoglobin 12.7, hematocrit 39.9, platelets 466,000    Assessment:  Myeloproliferative disorder (essential thrombocytosis) :  Patient will continue hydroxyurea 500 mg daily and aspirin 81 mg daily    Vitamin B12 deficiency:   She will continue to take oral B12.    Immune prophylaxis:   The patient will continue with the flu shot every fall and the Pneumovax every five years.  She is planning to get her shingles vaccine. She has gotten her COVID booster.     Plan:  - 6 month follow-up visit  - - labs 3 months (CBCd) and 6 months (CBCd, CMP and LDH)        Stan Colbert, COCO-CNP

## 2024-05-08 LAB — LDH SERPL L TO P-CCNC: 213 U/L (ref 84–246)

## 2024-05-19 DIAGNOSIS — E78.2 MIXED HYPERLIPIDEMIA: ICD-10-CM

## 2024-05-20 RX ORDER — ATORVASTATIN CALCIUM 10 MG/1
10 TABLET, FILM COATED ORAL DAILY
Qty: 100 TABLET | Refills: 2 | Status: SHIPPED | OUTPATIENT
Start: 2024-05-20

## 2024-06-18 ENCOUNTER — LAB (OUTPATIENT)
Dept: LAB | Facility: LAB | Age: 74
End: 2024-06-18
Payer: MEDICARE

## 2024-06-18 ENCOUNTER — OFFICE VISIT (OUTPATIENT)
Dept: PRIMARY CARE | Facility: CLINIC | Age: 74
End: 2024-06-18
Payer: MEDICARE

## 2024-06-18 VITALS
DIASTOLIC BLOOD PRESSURE: 77 MMHG | OXYGEN SATURATION: 94 % | SYSTOLIC BLOOD PRESSURE: 115 MMHG | HEART RATE: 74 BPM | BODY MASS INDEX: 25.44 KG/M2 | WEIGHT: 126.2 LBS | RESPIRATION RATE: 16 BRPM | HEIGHT: 59 IN

## 2024-06-18 DIAGNOSIS — Z00.00 ROUTINE GENERAL MEDICAL EXAMINATION AT HEALTH CARE FACILITY: ICD-10-CM

## 2024-06-18 DIAGNOSIS — Z12.31 SCREENING MAMMOGRAM FOR BREAST CANCER: ICD-10-CM

## 2024-06-18 DIAGNOSIS — E53.8 VITAMIN B 12 DEFICIENCY: ICD-10-CM

## 2024-06-18 DIAGNOSIS — Z00.00 MEDICARE ANNUAL WELLNESS VISIT, SUBSEQUENT: Primary | ICD-10-CM

## 2024-06-18 DIAGNOSIS — I10 ESSENTIAL HYPERTENSION: ICD-10-CM

## 2024-06-18 DIAGNOSIS — F51.01 PRIMARY INSOMNIA: ICD-10-CM

## 2024-06-18 DIAGNOSIS — E78.2 MIXED HYPERLIPIDEMIA: ICD-10-CM

## 2024-06-18 DIAGNOSIS — D47.1 MYELOPROLIFERATIVE DISORDER (MULTI): ICD-10-CM

## 2024-06-18 DIAGNOSIS — Z13.29 SCREENING FOR THYROID DISORDER: ICD-10-CM

## 2024-06-18 DIAGNOSIS — E55.9 VITAMIN D DEFICIENCY: ICD-10-CM

## 2024-06-18 LAB
25(OH)D3 SERPL-MCNC: 33 NG/ML (ref 31–100)
CHOLEST SERPL-MCNC: 214 MG/DL (ref 133–200)
CHOLEST/HDLC SERPL: 3.2 {RATIO}
HDLC SERPL-MCNC: 66 MG/DL
LDLC SERPL CALC-MCNC: 112 MG/DL (ref 65–130)
TRIGL SERPL-MCNC: 179 MG/DL (ref 40–150)
TSH SERPL DL<=0.05 MIU/L-ACNC: 3.56 MIU/L (ref 0.27–4.2)

## 2024-06-18 PROCEDURE — 36415 COLL VENOUS BLD VENIPUNCTURE: CPT

## 2024-06-18 PROCEDURE — G0439 PPPS, SUBSEQ VISIT: HCPCS

## 2024-06-18 PROCEDURE — 1124F ACP DISCUSS-NO DSCNMKR DOCD: CPT

## 2024-06-18 PROCEDURE — 80061 LIPID PANEL: CPT

## 2024-06-18 PROCEDURE — 82306 VITAMIN D 25 HYDROXY: CPT

## 2024-06-18 PROCEDURE — 3074F SYST BP LT 130 MM HG: CPT

## 2024-06-18 PROCEDURE — 3078F DIAST BP <80 MM HG: CPT

## 2024-06-18 PROCEDURE — 1126F AMNT PAIN NOTED NONE PRSNT: CPT

## 2024-06-18 PROCEDURE — 1157F ADVNC CARE PLAN IN RCRD: CPT

## 2024-06-18 PROCEDURE — 1159F MED LIST DOCD IN RCRD: CPT

## 2024-06-18 PROCEDURE — 84443 ASSAY THYROID STIM HORMONE: CPT

## 2024-06-18 PROCEDURE — 1170F FXNL STATUS ASSESSED: CPT

## 2024-06-18 PROCEDURE — 1036F TOBACCO NON-USER: CPT

## 2024-06-18 PROCEDURE — 99215 OFFICE O/P EST HI 40 MIN: CPT

## 2024-06-18 RX ORDER — ATORVASTATIN CALCIUM 20 MG/1
20 TABLET, FILM COATED ORAL DAILY
Qty: 100 TABLET | Refills: 1 | Status: SHIPPED | OUTPATIENT
Start: 2024-06-18 | End: 2025-01-04

## 2024-06-18 RX ORDER — ESZOPICLONE 2 MG/1
2 TABLET, FILM COATED ORAL NIGHTLY
Qty: 30 TABLET | Refills: 0 | Status: SHIPPED | OUTPATIENT
Start: 2024-06-18

## 2024-06-18 ASSESSMENT — ACTIVITIES OF DAILY LIVING (ADL)
HEARING - LEFT EAR: FUNCTIONAL
USING TELEPHONE: INDEPENDENT
ADEQUATE_TO_COMPLETE_ADL: YES
DRESSING YOURSELF: INDEPENDENT
JUDGMENT_ADEQUATE_SAFELY_COMPLETE_DAILY_ACTIVITIES: YES
BATHING: INDEPENDENT
WALKS IN HOME: INDEPENDENT
TAKING MEDICATION: INDEPENDENT
MANAGING FINANCES: INDEPENDENT
HEARING - RIGHT EAR: FUNCTIONAL
PILL BOX USED: NO
PREPARING MEALS: INDEPENDENT
GROOMING: INDEPENDENT
PATIENT'S MEMORY ADEQUATE TO SAFELY COMPLETE DAILY ACTIVITIES?: YES
TOILETING: INDEPENDENT
NEEDS ASSISTANCE WITH FOOD: INDEPENDENT
STIL DRIVING: YES
EATING: INDEPENDENT
USING TRANSPORTATION: INDEPENDENT
DOING HOUSEWORK: INDEPENDENT
FEEDING YOURSELF: INDEPENDENT
GROCERY SHOPPING: INDEPENDENT

## 2024-06-18 ASSESSMENT — PAIN SCALES - GENERAL: PAINLEVEL: 0-NO PAIN

## 2024-06-18 ASSESSMENT — COLUMBIA-SUICIDE SEVERITY RATING SCALE - C-SSRS
2. HAVE YOU ACTUALLY HAD ANY THOUGHTS OF KILLING YOURSELF?: NO
6. HAVE YOU EVER DONE ANYTHING, STARTED TO DO ANYTHING, OR PREPARED TO DO ANYTHING TO END YOUR LIFE?: NO
1. IN THE PAST MONTH, HAVE YOU WISHED YOU WERE DEAD OR WISHED YOU COULD GO TO SLEEP AND NOT WAKE UP?: NO

## 2024-06-18 ASSESSMENT — ENCOUNTER SYMPTOMS
OCCASIONAL FEELINGS OF UNSTEADINESS: 0
LOSS OF SENSATION IN FEET: 0
DEPRESSION: 0

## 2024-06-18 ASSESSMENT — PATIENT HEALTH QUESTIONNAIRE - PHQ9: 2. FEELING DOWN, DEPRESSED OR HOPELESS: NOT AT ALL

## 2024-06-18 NOTE — PROGRESS NOTES
Subjective   Reason for Visit: Victoria Trotter is an 74 y.o. female here for a Medicare Wellness visit.     Past Medical, Surgical, and Family History reviewed and updated in chart.    Reviewed all medications by prescribing practitioner or clinical pharmacist (such as prescriptions, OTCs, herbal therapies and supplements) and documented in the medical record.    HPI  Patient denies any falls, urgent care, ER, hospitalization, new diagnoses, surgeries in the past year.  Denies any issues with chest pain, chest pressure, shortness of breath, constipation, diarrhea, blood in stool, urinary urgency, frequency, blood in urine, muscle weakness in arms and legs, numbness or tingling in fingers or toes.     would like a refill on Lunesta at this time.   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 Bedtime typically 11, the night before watching bedtime is 9 so it is harder to fall asleep. Discussed referral to sleep medicine to discuss proper treatment for insomnia.      Patient Care Team:  PERLA Torres as PCP - General (Internal Medicine)  PERLA Blackwell as Primary Care Provider     Review of Systems  Review of Systems negative except as noted in HPI and Chief complaint.    Current Outpatient Medications:     ascorbic acid (Vitamin C) 500 mg tablet, Take 1 tablet (500 mg) by mouth once daily., Disp: , Rfl:     aspirin (Lo-Dose Aspirin) 81 mg EC tablet, Take 1 tablet (81 mg) by mouth once daily., Disp: , Rfl:     atenolol (Tenormin) 25 mg tablet, TAKE 1 TABLET BY MOUTH ONCE  DAILY, Disp: 100 tablet, Rfl: 2    atorvastatin (Lipitor) 10 mg tablet, TAKE 1 TABLET BY MOUTH ONCE  DAILY, Disp: 100 tablet, Rfl: 2    cholecalciferol (Vitamin D3) 25 MCG (1000 UT) capsule, Take 1 capsule (25 mcg) by mouth once daily., Disp: , Rfl:     cyanocobalamin (Vitamin B-12) 1,000 mcg tablet, 1 tablet (1,000 mcg) once daily., Disp: , Rfl:     escitalopram (Lexapro) 20 mg tablet, Take 1  "tablet (20 mg) by mouth once daily., Disp: , Rfl:     gabapentin (Neurontin) 100 mg capsule, , Disp: , Rfl:     hydroxyurea (Hydrea) 500 mg capsule, Take 1 capsule (500 mg total) by mouth once daily., Disp: 90 capsule, Rfl: 3    eszopiclone (Lunesta) 2 mg tablet, Take 1 tablet (2 mg) by mouth once daily at bedtime. As needed, Disp: 30 tablet, Rfl: 0    Objective   Vitals:  /77 (BP Location: Left arm, Patient Position: Sitting, BP Cuff Size: Adult)   Pulse 74   Resp 16   Ht 1.499 m (4' 11\")   Wt 57.2 kg (126 lb 3.2 oz)   SpO2 94%   BMI 25.49 kg/m²       Physical Exam  Vitals reviewed.   Constitutional:       General: She is not in acute distress.     Appearance: Normal appearance.   HENT:      Head: Normocephalic.      Right Ear: Tympanic membrane normal.      Left Ear: Tympanic membrane normal.      Nose: Nose normal.      Mouth/Throat:      Mouth: Mucous membranes are moist.      Pharynx: Oropharynx is clear.   Eyes:      Extraocular Movements: Extraocular movements intact.      Conjunctiva/sclera: Conjunctivae normal.      Pupils: Pupils are equal, round, and reactive to light.   Cardiovascular:      Rate and Rhythm: Normal rate.      Pulses: Normal pulses.      Heart sounds: Normal heart sounds.   Pulmonary:      Effort: Pulmonary effort is normal.      Breath sounds: Normal breath sounds.   Abdominal:      General: Abdomen is flat. Bowel sounds are normal.      Palpations: Abdomen is soft.   Musculoskeletal:         General: Normal range of motion.   Skin:     General: Skin is warm and dry.      Capillary Refill: Capillary refill takes 2 to 3 seconds.   Neurological:      General: No focal deficit present.      Mental Status: She is alert and oriented to person, place, and time. Mental status is at baseline.   Psychiatric:         Mood and Affect: Mood normal.         Behavior: Behavior is cooperative.         Assessment/Plan   Problem List Items Addressed This Visit       Essential hypertension    " Mixed hyperlipidemia    Relevant Orders    Lipid Panel    Myeloproliferative disorder (Multi)    Primary insomnia    Relevant Medications    eszopiclone (Lunesta) 2 mg tablet    Other Relevant Orders    Referral to Adult Sleep Medicine    Vitamin B 12 deficiency    Vitamin D deficiency    Relevant Orders    Vitamin D 25-Hydroxy,Total (for eval of Vitamin D levels)     Other Visit Diagnoses       Medicare annual wellness visit, subsequent    -  Primary    Screening for thyroid disorder        Relevant Orders    TSH with reflex to Free T4 if abnormal    Routine general medical examination at health care facility        Screening mammogram for breast cancer        Relevant Orders    BI mammo bilateral screening tomosynthesis        MAMMOGRAM: For breast cancer screening   I have ordered you a mammogram to be done as soon as you are able.  We will call the results.    LAB Order/ BLOOD TESTS   I have ordered lab work for you to get done. This should be fasting. Nothing to eat or drink after midnight besides black tea,  black coffee, or water. If you do not hear from this office within two days of having your labs done, please call for your results.     HYPERLIPIDEMIA:  Decrease intake of saturated fats, fast food, sweets.  Increase intake of fresh fruit fresh vegetables and lean meats.  Increase healthy fats seeds, nuts, olive oil instead of butter.  walk 150 minutes/week for heart health.    HYPERTENSION:   Decrease intake of processed foods, fast foods, lunch meat, canned soups, canned veggies.  Increase intake of fresh fruits, veggies, and lean meats. Monitor blood pressure at home, keep a log and bring this with you to your next appointment. Call the office if your blood pressure runs 150/90 or higher consistently.    Blood Pressure Technique:  Sit quietly in a chair for 5 minutes with back supported and feet on the floor  Then place left arm on a table or armrest so bicep area is at the same level of heart or left  breast  Check three times in a row, disregard the highest reading and average the other two    Advanced care planning was discussed with Victoria Trotter today. We reviewed code status, Medical Power of , and Living will. Pt has LW or POA.   *This note was dictated using DRAGON speech recognition software and was corrected for spelling or grammatical errors, but despite proofreading several typographical errors might be present that might affect the meaning of the content.*  Mimi Marlow, CNP

## 2024-06-19 ENCOUNTER — HOSPITAL ENCOUNTER (OUTPATIENT)
Facility: HOSPITAL | Age: 74
Setting detail: OBSERVATION
LOS: 1 days | Discharge: NURSING, REHAB OR LONG-TERM CARE | End: 2024-06-21
Attending: STUDENT IN AN ORGANIZED HEALTH CARE EDUCATION/TRAINING PROGRAM | Admitting: STUDENT IN AN ORGANIZED HEALTH CARE EDUCATION/TRAINING PROGRAM
Payer: MEDICARE

## 2024-06-19 ENCOUNTER — APPOINTMENT (OUTPATIENT)
Dept: RADIOLOGY | Facility: HOSPITAL | Age: 74
End: 2024-06-19
Payer: MEDICARE

## 2024-06-19 ENCOUNTER — APPOINTMENT (OUTPATIENT)
Dept: CARDIOLOGY | Facility: HOSPITAL | Age: 74
End: 2024-06-19
Payer: MEDICARE

## 2024-06-19 DIAGNOSIS — S32.591A: Primary | ICD-10-CM

## 2024-06-19 DIAGNOSIS — S32.591A CLOSED FRACTURE OF RAMUS OF RIGHT PUBIS, INITIAL ENCOUNTER (MULTI): ICD-10-CM

## 2024-06-19 LAB
ANION GAP SERPL CALC-SCNC: 10 MMOL/L
BASO STIPL BLD QL SMEAR: PRESENT
BASOPHILS # BLD MANUAL: 0.14 X10*3/UL (ref 0–0.1)
BASOPHILS NFR BLD MANUAL: 2 %
BUN SERPL-MCNC: 23 MG/DL (ref 8–25)
CALCIUM SERPL-MCNC: 9.7 MG/DL (ref 8.5–10.4)
CHLORIDE SERPL-SCNC: 103 MMOL/L (ref 97–107)
CO2 SERPL-SCNC: 27 MMOL/L (ref 24–31)
CREAT SERPL-MCNC: 0.9 MG/DL (ref 0.4–1.6)
DACRYOCYTES BLD QL SMEAR: ABNORMAL
EGFRCR SERPLBLD CKD-EPI 2021: 67 ML/MIN/1.73M*2
EOSINOPHIL # BLD MANUAL: 0.35 X10*3/UL (ref 0–0.4)
EOSINOPHIL NFR BLD MANUAL: 5 %
ERYTHROCYTE [DISTWIDTH] IN BLOOD BY AUTOMATED COUNT: 15.6 % (ref 11.5–14.5)
GIANT PLATELETS BLD QL SMEAR: ABNORMAL
GLUCOSE SERPL-MCNC: 106 MG/DL (ref 65–99)
HCT VFR BLD AUTO: 36.2 % (ref 36–46)
HGB BLD-MCNC: 12 G/DL (ref 12–16)
HYPOCHROMIA BLD QL SMEAR: ABNORMAL
IMM GRANULOCYTES # BLD AUTO: 0.56 X10*3/UL (ref 0–0.5)
IMM GRANULOCYTES NFR BLD AUTO: 8 % (ref 0–0.9)
LYMPHOCYTES # BLD MANUAL: 2.1 X10*3/UL (ref 0.8–3)
LYMPHOCYTES NFR BLD MANUAL: 30 %
MCH RBC QN AUTO: 35.5 PG (ref 26–34)
MCHC RBC AUTO-ENTMCNC: 33.1 G/DL (ref 32–36)
MCV RBC AUTO: 107 FL (ref 80–100)
METAMYELOCYTES # BLD MANUAL: 0.14 X10*3/UL
METAMYELOCYTES NFR BLD MANUAL: 2 %
MONOCYTES # BLD MANUAL: 0.35 X10*3/UL (ref 0.05–0.8)
MONOCYTES NFR BLD MANUAL: 5 %
NEUTROPHILS # BLD MANUAL: 3.71 X10*3/UL (ref 1.6–5.5)
NEUTS BAND # BLD MANUAL: 0.21 X10*3/UL (ref 0–0.5)
NEUTS BAND NFR BLD MANUAL: 3 %
NEUTS SEG # BLD MANUAL: 3.5 X10*3/UL (ref 1.6–5)
NEUTS SEG NFR BLD MANUAL: 50 %
NRBC BLD-RTO: 0 /100 WBCS (ref 0–0)
OVALOCYTES BLD QL SMEAR: ABNORMAL
PLATELET # BLD AUTO: 507 X10*3/UL (ref 150–450)
POLYCHROMASIA BLD QL SMEAR: ABNORMAL
POTASSIUM SERPL-SCNC: 4.5 MMOL/L (ref 3.4–5.1)
RBC # BLD AUTO: 3.38 X10*6/UL (ref 4–5.2)
RBC MORPH BLD: ABNORMAL
SCHISTOCYTES BLD QL SMEAR: ABNORMAL
SODIUM SERPL-SCNC: 140 MMOL/L (ref 133–145)
TOTAL CELLS COUNTED BLD: 100
VARIANT LYMPHS # BLD MANUAL: 0.21 X10*3/UL (ref 0–0.3)
VARIANT LYMPHS NFR BLD: 3 %
WBC # BLD AUTO: 7 X10*3/UL (ref 4.4–11.3)

## 2024-06-19 PROCEDURE — 72125 CT NECK SPINE W/O DYE: CPT | Performed by: RADIOLOGY

## 2024-06-19 PROCEDURE — 2500000005 HC RX 250 GENERAL PHARMACY W/O HCPCS: Performed by: STUDENT IN AN ORGANIZED HEALTH CARE EDUCATION/TRAINING PROGRAM

## 2024-06-19 PROCEDURE — 2500000001 HC RX 250 WO HCPCS SELF ADMINISTERED DRUGS (ALT 637 FOR MEDICARE OP): Performed by: STUDENT IN AN ORGANIZED HEALTH CARE EDUCATION/TRAINING PROGRAM

## 2024-06-19 PROCEDURE — 2500000004 HC RX 250 GENERAL PHARMACY W/ HCPCS (ALT 636 FOR OP/ED): Performed by: STUDENT IN AN ORGANIZED HEALTH CARE EDUCATION/TRAINING PROGRAM

## 2024-06-19 PROCEDURE — 70450 CT HEAD/BRAIN W/O DYE: CPT | Performed by: RADIOLOGY

## 2024-06-19 PROCEDURE — 96372 THER/PROPH/DIAG INJ SC/IM: CPT | Performed by: STUDENT IN AN ORGANIZED HEALTH CARE EDUCATION/TRAINING PROGRAM

## 2024-06-19 PROCEDURE — 85007 BL SMEAR W/DIFF WBC COUNT: CPT | Performed by: PHYSICIAN ASSISTANT

## 2024-06-19 PROCEDURE — 73523 X-RAY EXAM HIPS BI 5/> VIEWS: CPT

## 2024-06-19 PROCEDURE — 99285 EMERGENCY DEPT VISIT HI MDM: CPT | Mod: 25

## 2024-06-19 PROCEDURE — 93005 ELECTROCARDIOGRAM TRACING: CPT

## 2024-06-19 PROCEDURE — 36415 COLL VENOUS BLD VENIPUNCTURE: CPT | Performed by: PHYSICIAN ASSISTANT

## 2024-06-19 PROCEDURE — G0378 HOSPITAL OBSERVATION PER HR: HCPCS

## 2024-06-19 PROCEDURE — 85027 COMPLETE CBC AUTOMATED: CPT | Performed by: PHYSICIAN ASSISTANT

## 2024-06-19 PROCEDURE — 70450 CT HEAD/BRAIN W/O DYE: CPT

## 2024-06-19 PROCEDURE — 72125 CT NECK SPINE W/O DYE: CPT

## 2024-06-19 PROCEDURE — 80048 BASIC METABOLIC PNL TOTAL CA: CPT | Performed by: PHYSICIAN ASSISTANT

## 2024-06-19 PROCEDURE — 73523 X-RAY EXAM HIPS BI 5/> VIEWS: CPT | Mod: BILATERAL PROCEDURE | Performed by: RADIOLOGY

## 2024-06-19 RX ORDER — ATENOLOL 25 MG/1
25 TABLET ORAL DAILY
Status: DISCONTINUED | OUTPATIENT
Start: 2024-06-20 | End: 2024-06-21 | Stop reason: HOSPADM

## 2024-06-19 RX ORDER — CHOLECALCIFEROL (VITAMIN D3) 25 MCG
1000 TABLET ORAL DAILY
Status: DISCONTINUED | OUTPATIENT
Start: 2024-06-19 | End: 2024-06-21 | Stop reason: HOSPADM

## 2024-06-19 RX ORDER — FLUTICASONE PROPIONATE 50 MCG
1 SPRAY, SUSPENSION (ML) NASAL DAILY PRN
COMMUNITY

## 2024-06-19 RX ORDER — PSEUDOEPHEDRINE HCL 120 MG/1
120 TABLET, FILM COATED, EXTENDED RELEASE ORAL EVERY 12 HOURS PRN
COMMUNITY
End: 2024-06-21 | Stop reason: HOSPADM

## 2024-06-19 RX ORDER — ASPIRIN 81 MG/1
81 TABLET ORAL DAILY
Status: DISCONTINUED | OUTPATIENT
Start: 2024-06-19 | End: 2024-06-21 | Stop reason: HOSPADM

## 2024-06-19 RX ORDER — ZOLPIDEM TARTRATE 5 MG/1
5 TABLET ORAL NIGHTLY PRN
Status: DISCONTINUED | OUTPATIENT
Start: 2024-06-19 | End: 2024-06-21 | Stop reason: HOSPADM

## 2024-06-19 RX ORDER — LIDOCAINE 560 MG/1
1 PATCH PERCUTANEOUS; TOPICAL; TRANSDERMAL DAILY
Status: DISCONTINUED | OUTPATIENT
Start: 2024-06-19 | End: 2024-06-21 | Stop reason: HOSPADM

## 2024-06-19 RX ORDER — PANTOPRAZOLE SODIUM 40 MG/10ML
40 INJECTION, POWDER, LYOPHILIZED, FOR SOLUTION INTRAVENOUS
Status: DISCONTINUED | OUTPATIENT
Start: 2024-06-19 | End: 2024-06-21 | Stop reason: HOSPADM

## 2024-06-19 RX ORDER — GABAPENTIN 100 MG/1
100 CAPSULE ORAL 2 TIMES DAILY
Status: DISCONTINUED | OUTPATIENT
Start: 2024-06-19 | End: 2024-06-21 | Stop reason: HOSPADM

## 2024-06-19 RX ORDER — HYDROXYUREA 500 MG/1
500 CAPSULE ORAL DAILY
Status: DISCONTINUED | OUTPATIENT
Start: 2024-06-20 | End: 2024-06-21 | Stop reason: HOSPADM

## 2024-06-19 RX ORDER — ASCORBIC ACID 500 MG
500 TABLET ORAL DAILY
Status: DISCONTINUED | OUTPATIENT
Start: 2024-06-19 | End: 2024-06-21 | Stop reason: HOSPADM

## 2024-06-19 RX ORDER — LANOLIN ALCOHOL/MO/W.PET/CERES
1000 CREAM (GRAM) TOPICAL DAILY
Status: DISCONTINUED | OUTPATIENT
Start: 2024-06-20 | End: 2024-06-21 | Stop reason: HOSPADM

## 2024-06-19 RX ORDER — ATORVASTATIN CALCIUM 20 MG/1
20 TABLET, FILM COATED ORAL NIGHTLY
Status: DISCONTINUED | OUTPATIENT
Start: 2024-06-19 | End: 2024-06-21 | Stop reason: HOSPADM

## 2024-06-19 RX ORDER — POLYETHYLENE GLYCOL 3350 17 G/17G
17 POWDER, FOR SOLUTION ORAL DAILY
Status: DISCONTINUED | OUTPATIENT
Start: 2024-06-19 | End: 2024-06-21 | Stop reason: HOSPADM

## 2024-06-19 RX ORDER — OXYCODONE HYDROCHLORIDE 5 MG/1
5 TABLET ORAL EVERY 4 HOURS PRN
Status: DISCONTINUED | OUTPATIENT
Start: 2024-06-19 | End: 2024-06-21 | Stop reason: HOSPADM

## 2024-06-19 RX ORDER — ACETAMINOPHEN 325 MG/1
975 TABLET ORAL ONCE
Status: COMPLETED | OUTPATIENT
Start: 2024-06-19 | End: 2024-06-19

## 2024-06-19 RX ORDER — ESCITALOPRAM OXALATE 20 MG/1
20 TABLET ORAL DAILY
Status: DISCONTINUED | OUTPATIENT
Start: 2024-06-20 | End: 2024-06-21 | Stop reason: HOSPADM

## 2024-06-19 RX ORDER — ENOXAPARIN SODIUM 100 MG/ML
40 INJECTION SUBCUTANEOUS DAILY
Status: DISCONTINUED | OUTPATIENT
Start: 2024-06-19 | End: 2024-06-21 | Stop reason: HOSPADM

## 2024-06-19 RX ORDER — FLUTICASONE PROPIONATE 50 MCG
1 SPRAY, SUSPENSION (ML) NASAL DAILY PRN
Status: DISCONTINUED | OUTPATIENT
Start: 2024-06-19 | End: 2024-06-21 | Stop reason: HOSPADM

## 2024-06-19 RX ORDER — ACETAMINOPHEN 325 MG/1
975 TABLET ORAL EVERY 8 HOURS
Status: DISCONTINUED | OUTPATIENT
Start: 2024-06-19 | End: 2024-06-21 | Stop reason: HOSPADM

## 2024-06-19 RX ORDER — OXYCODONE HYDROCHLORIDE 5 MG/1
10 TABLET ORAL EVERY 4 HOURS PRN
Status: DISCONTINUED | OUTPATIENT
Start: 2024-06-19 | End: 2024-06-21 | Stop reason: HOSPADM

## 2024-06-19 RX ORDER — PANTOPRAZOLE SODIUM 40 MG/1
40 TABLET, DELAYED RELEASE ORAL
Status: DISCONTINUED | OUTPATIENT
Start: 2024-06-19 | End: 2024-06-21 | Stop reason: HOSPADM

## 2024-06-19 RX ORDER — GUAIFENESIN/DEXTROMETHORPHAN 100-10MG/5
5 SYRUP ORAL EVERY 4 HOURS PRN
Status: DISCONTINUED | OUTPATIENT
Start: 2024-06-19 | End: 2024-06-21 | Stop reason: HOSPADM

## 2024-06-19 RX ORDER — GUAIFENESIN 600 MG/1
1200 TABLET, EXTENDED RELEASE ORAL 2 TIMES DAILY PRN
COMMUNITY
End: 2024-06-21 | Stop reason: HOSPADM

## 2024-06-19 RX ORDER — OXYMETAZOLINE HCL 0.05 %
2 SPRAY, NON-AEROSOL (ML) NASAL AS NEEDED
COMMUNITY
End: 2024-06-21 | Stop reason: HOSPADM

## 2024-06-19 SDOH — SOCIAL STABILITY: SOCIAL INSECURITY: DO YOU FEEL ANYONE HAS EXPLOITED OR TAKEN ADVANTAGE OF YOU FINANCIALLY OR OF YOUR PERSONAL PROPERTY?: NO

## 2024-06-19 SDOH — SOCIAL STABILITY: SOCIAL INSECURITY: DOES ANYONE TRY TO KEEP YOU FROM HAVING/CONTACTING OTHER FRIENDS OR DOING THINGS OUTSIDE YOUR HOME?: NO

## 2024-06-19 SDOH — SOCIAL STABILITY: SOCIAL INSECURITY: HAS ANYONE EVER THREATENED TO HURT YOUR FAMILY OR YOUR PETS?: NO

## 2024-06-19 SDOH — SOCIAL STABILITY: SOCIAL INSECURITY: HAVE YOU HAD THOUGHTS OF HARMING ANYONE ELSE?: NO

## 2024-06-19 SDOH — SOCIAL STABILITY: SOCIAL INSECURITY: ABUSE: ADULT

## 2024-06-19 SDOH — SOCIAL STABILITY: SOCIAL INSECURITY: ARE THERE ANY APPARENT SIGNS OF INJURIES/BEHAVIORS THAT COULD BE RELATED TO ABUSE/NEGLECT?: NO

## 2024-06-19 SDOH — SOCIAL STABILITY: SOCIAL INSECURITY: WERE YOU ABLE TO COMPLETE ALL THE BEHAVIORAL HEALTH SCREENINGS?: YES

## 2024-06-19 SDOH — SOCIAL STABILITY: SOCIAL INSECURITY: HAVE YOU HAD ANY THOUGHTS OF HARMING ANYONE ELSE?: NO

## 2024-06-19 SDOH — SOCIAL STABILITY: SOCIAL INSECURITY: ARE YOU OR HAVE YOU BEEN THREATENED OR ABUSED PHYSICALLY, EMOTIONALLY, OR SEXUALLY BY ANYONE?: NO

## 2024-06-19 SDOH — SOCIAL STABILITY: SOCIAL INSECURITY: DO YOU FEEL UNSAFE GOING BACK TO THE PLACE WHERE YOU ARE LIVING?: NO

## 2024-06-19 ASSESSMENT — PAIN DESCRIPTION - DESCRIPTORS
DESCRIPTORS: ACHING
DESCRIPTORS: SHARP
DESCRIPTORS: SHARP

## 2024-06-19 ASSESSMENT — LIFESTYLE VARIABLES
HOW MANY STANDARD DRINKS CONTAINING ALCOHOL DO YOU HAVE ON A TYPICAL DAY: 1 OR 2
EVER HAD A DRINK FIRST THING IN THE MORNING TO STEADY YOUR NERVES TO GET RID OF A HANGOVER: NO
AUDIT-C TOTAL SCORE: 2
TOTAL SCORE: 0
HAVE YOU EVER FELT YOU SHOULD CUT DOWN ON YOUR DRINKING: NO
AUDIT-C TOTAL SCORE: 2
HOW OFTEN DO YOU HAVE A DRINK CONTAINING ALCOHOL: 2-4 TIMES A MONTH
EVER FELT BAD OR GUILTY ABOUT YOUR DRINKING: NO
HAVE PEOPLE ANNOYED YOU BY CRITICIZING YOUR DRINKING: NO
HOW OFTEN DO YOU HAVE 6 OR MORE DRINKS ON ONE OCCASION: NEVER
SKIP TO QUESTIONS 9-10: 1

## 2024-06-19 ASSESSMENT — PAIN - FUNCTIONAL ASSESSMENT
PAIN_FUNCTIONAL_ASSESSMENT: 0-10

## 2024-06-19 ASSESSMENT — ACTIVITIES OF DAILY LIVING (ADL)
BATHING: INDEPENDENT
ASSISTIVE_DEVICE: DENTURES UPPER;EYEGLASSES
PATIENT'S MEMORY ADEQUATE TO SAFELY COMPLETE DAILY ACTIVITIES?: YES
DRESSING YOURSELF: INDEPENDENT
HEARING - RIGHT EAR: FUNCTIONAL
FEEDING YOURSELF: INDEPENDENT
TOILETING: INDEPENDENT
BATHING: INDEPENDENT
LACK_OF_TRANSPORTATION: NO
TOILETING: INDEPENDENT
HEARING - RIGHT EAR: FUNCTIONAL
GROOMING: INDEPENDENT
HEARING - LEFT EAR: FUNCTIONAL
ADEQUATE_TO_COMPLETE_ADL: YES
HEARING - LEFT EAR: FUNCTIONAL
DRESSING YOURSELF: INDEPENDENT
WALKS IN HOME: INDEPENDENT
PATIENT'S MEMORY ADEQUATE TO SAFELY COMPLETE DAILY ACTIVITIES?: YES
GROOMING: INDEPENDENT
ADEQUATE_TO_COMPLETE_ADL: YES
WALKS IN HOME: INDEPENDENT
LACK_OF_TRANSPORTATION: NO
FEEDING YOURSELF: INDEPENDENT
JUDGMENT_ADEQUATE_SAFELY_COMPLETE_DAILY_ACTIVITIES: YES

## 2024-06-19 ASSESSMENT — ENCOUNTER SYMPTOMS
MYALGIAS: 1
DIZZINESS: 0
FEVER: 0
ARTHRALGIAS: 1
SHORTNESS OF BREATH: 0
CONFUSION: 0
NAUSEA: 0
VOMITING: 0
TROUBLE SWALLOWING: 0

## 2024-06-19 ASSESSMENT — PAIN DESCRIPTION - PAIN TYPE
TYPE: ACUTE PAIN
TYPE: ACUTE PAIN

## 2024-06-19 ASSESSMENT — PAIN DESCRIPTION - FREQUENCY: FREQUENCY: CONSTANT/CONTINUOUS

## 2024-06-19 ASSESSMENT — COGNITIVE AND FUNCTIONAL STATUS - GENERAL
PATIENT BASELINE BEDBOUND: NO
MOBILITY SCORE: 24
DAILY ACTIVITIY SCORE: 24

## 2024-06-19 ASSESSMENT — PATIENT HEALTH QUESTIONNAIRE - PHQ9
2. FEELING DOWN, DEPRESSED OR HOPELESS: NOT AT ALL
SUM OF ALL RESPONSES TO PHQ9 QUESTIONS 1 & 2: 0
1. LITTLE INTEREST OR PLEASURE IN DOING THINGS: NOT AT ALL

## 2024-06-19 ASSESSMENT — PAIN SCALES - GENERAL
PAINLEVEL_OUTOF10: 1
PAINLEVEL_OUTOF10: 7
PAINLEVEL_OUTOF10: 1
PAINLEVEL_OUTOF10: 6
PAINLEVEL_OUTOF10: 6
PAINLEVEL_OUTOF10: 7
PAINLEVEL_OUTOF10: 1

## 2024-06-19 ASSESSMENT — PAIN DESCRIPTION - LOCATION
LOCATION: HIP

## 2024-06-19 ASSESSMENT — COLUMBIA-SUICIDE SEVERITY RATING SCALE - C-SSRS
1. IN THE PAST MONTH, HAVE YOU WISHED YOU WERE DEAD OR WISHED YOU COULD GO TO SLEEP AND NOT WAKE UP?: NO
6. HAVE YOU EVER DONE ANYTHING, STARTED TO DO ANYTHING, OR PREPARED TO DO ANYTHING TO END YOUR LIFE?: NO
2. HAVE YOU ACTUALLY HAD ANY THOUGHTS OF KILLING YOURSELF?: NO

## 2024-06-19 ASSESSMENT — PAIN DESCRIPTION - ORIENTATION
ORIENTATION: RIGHT

## 2024-06-19 ASSESSMENT — PAIN DESCRIPTION - PROGRESSION: CLINICAL_PROGRESSION: NOT CHANGED

## 2024-06-19 NOTE — NURSING NOTE
Patient arrived on unit from the ED in stable condition, no sign of acute distress at this time. Patient transferred from the stretcher to the bed, and connected to pure wick and she was able to void with no issues. Due to the movement upon transfer patient does report pain 7/10. Room orientation and safety reviewed with patient and family at bedside. Will continue to assess and provide nursing care.

## 2024-06-19 NOTE — PROGRESS NOTES
Pharmacy Medication History Review    Victoria Trotter is a 74 y.o. female admitted for Fracture of right inferior pubic ramus, closed, initial encounter (Multi). Pharmacy reviewed the patient's rlnwn-tq-ynkvcklod medications and allergies for accuracy.    Medications ADDED:  Afrin  Sinus allergy  Flonase  mucinex  Medications CHANGED:  none  Medications REMOVED:   none     The list below reflects the updated PTA list. Comments regarding how patient may be taking medications differently can be found in the Admit Orders Activity  Prior to Admission Medications   Prescriptions Last Dose Informant   ascorbic acid (Vitamin C) 500 mg tablet Unknown Self   Sig: Take 1 tablet (500 mg) by mouth once daily.   aspirin (Lo-Dose Aspirin) 81 mg EC tablet 2024 Self   Sig: Take 1 tablet (81 mg) by mouth once daily.   atenolol (Tenormin) 25 mg tablet 2024 Self   Sig: TAKE 1 TABLET BY MOUTH ONCE  DAILY   atorvastatin (Lipitor) 20 mg tablet 2024 Self   Sig: Take 1 tablet (20 mg) by mouth once daily.   cholecalciferol (Vitamin D3) 25 MCG (1000 UT) capsule Unknown Self   Sig: Take 1 capsule (25 mcg) by mouth once daily.   cyanocobalamin (Vitamin B-12) 1,000 mcg tablet 2024 Self   Si tablet (1,000 mcg) once daily.   escitalopram (Lexapro) 20 mg tablet 2024 Self   Sig: Take 1 tablet (20 mg) by mouth once daily.   eszopiclone (Lunesta) 2 mg tablet 2024 Self   Sig: Take 1 tablet (2 mg) by mouth once daily at bedtime. As needed   fluticasone (Flonase) 50 mcg/actuation nasal spray Past Week Self   Sig: Administer 1 spray into each nostril once daily as needed for rhinitis. Shake gently. Before first use, prime pump. After use, clean tip and replace cap.   gabapentin (Neurontin) 100 mg capsule 2024 Self   guaiFENesin (Mucinex) 600 mg 12 hr tablet Past Week Self   Sig: Take 2 tablets (1,200 mg) by mouth 2 times a day as needed for cough. Do not crush, chew, or split.   hydroxyurea (Hydrea) 500 mg capsule  6/19/2024 Self   Sig: Take 1 capsule (500 mg total) by mouth once daily.   oxymetazoline (Afrin) 0.05 % nasal spray Past Week Self   Sig: Administer 2 sprays into each nostril if needed for congestion. Do not use for more than 3 days.   pseudoephedrine ER (Sudafed-12 Hour) 120 mg 12 hr tablet Past Week Self   Sig: Take 1 tablet (120 mg) by mouth every 12 hours if needed for congestion. Do not crush, chew, or split.      Facility-Administered Medications: None        The list below reflects the updated allergy list. Please review each documented allergy for additional clarification and justification.  Allergies  Reviewed by Elise Rivas on 6/19/2024   No Known Allergies         Pharmacy has been updated to [facility].    Sources used to complete the med history include dispense history, PTA medication list, patient interview. Patient is a good historian.    Below are additional concerns with the patient's PTA list.  none    Elise Rivas, CPhT-ADV  Please reach out via [employer] Secure Chat for questions

## 2024-06-19 NOTE — ED PROVIDER NOTES
HPI   Chief Complaint   Patient presents with    Hip Pain     Presents to ED via EMS for rt hip pain and head injury.  States she was running after her [place]child at the park when she tripped and fell landing on her rt hip and hitting the back of her head on concrete.  Denies LOC or blood thinner use.         This is a 74-year-old female presenting to the emergency department with complaints of mechanical fall today.  Patient states she was chasing her grandson at the park when she tripped and fell.  She landed predominantly on her hips but she did hit the back of her head as well.  She does not take any blood thinners.  No loss of consciousness.  No preceding dizziness, lightheadedness, chest pain or shortness of breath.  Patient states she has pain in her pelvic region.  She was unable to ambulate after the fall due to pain.  States that she has some mild pain in her head and neck but no pain elsewhere.      Please see HPI for pertinent positive and negative ROS.                   April Coma Scale Score: 15                     Patient History   Past Medical History:   Diagnosis Date    High cholesterol     Hypertension      No past surgical history on file.  Family History   Problem Relation Name Age of Onset    Breast cancer Mother      Throat cancer Father       Social History     Tobacco Use    Smoking status: Former     Current packs/day: 0.00     Average packs/day: 0.3 packs/day for 10.0 years (2.5 ttl pk-yrs)     Types: Cigarettes     Start date:      Quit date:      Years since quittin.4     Passive exposure: Past    Smokeless tobacco: Never   Vaping Use    Vaping status: Never Used   Substance Use Topics    Alcohol use: Yes     Alcohol/week: 2.0 standard drinks of alcohol     Types: 2 Glasses of wine per week     Comment: occassional    Drug use: Never       Physical Exam   ED Triage Vitals [24 1227]   Temperature Heart Rate Respirations BP   36.6 °C (97.9 °F) 73 16 113/68      Pulse  Ox Temp Source Heart Rate Source Patient Position   96 % Oral Monitor --      BP Location FiO2 (%)     -- --       Physical Exam  GENERAL APPEARANCE: Awake and alert. No acute respiratory distress.   VITAL SIGNS: As per the nurses' triage record.  HEENT: Normocephalic, atraumatic. Extraocular muscles are intact. Conjunctiva are pink. Negative scleral icterus. Mucous membranes are moist.   NECK: Soft, nontender and supple, full gross ROM, no meningeal signs.  No bony step-offs appreciated over cervical spinous processes.  CHEST: Nontender to palpation. Clear to auscultation bilaterally. No rales, rhonchi, or wheezing. Symmetric rise and fall of chest wall.   HEART: Clear S1 and S2. Regular rate and rhythm. No murmurs appreciated on auscultation.  Strong and equal pulses in the extremities.  ABDOMEN: Soft, nontender, nondistended, positive bowel sounds, no palpable masses.  MUSCULOSKELETAL: The calves are nontender to palpation. Full gross active range of motion of upper extremities bilaterally.  Patient cannot lift her legs off the bed bilaterally due to pain.  Nurse to palpation over the hip bilaterally and pubic region.  No overlying skin changes.  2+ dorsalis pedis pulses bilaterally.  NEUROLOGICAL: Awake, alert and oriented x 3. Motor power intact in the upper and lower extremities. Sensation is intact to light touch in the upper and lower extremities. Patient answering questions appropriately.   IMMUNOLOGICAL: No lymphatic streaking noted  DERMATOLOGIC: Warm and dry   PYSCH: Cooperative with appropriate mood and affect.  ED Course & MDM   ED Course as of 06/19/24 1444   Wed Jun 19, 2024   1225 Normal sinus rhythm rate of 70.  OR interval is normal.  QS duration is normal QTc is normal.  Nonspecific ST segment T wave changes.  There are no previous EKG she is for direct comparison. [AH]      ED Course User Index  [AH] Ludwin Wilson MD         Diagnoses as of 06/19/24 1444   Closed fracture of ramus of right pubis,  initial encounter (Multi)   Fracture of right inferior pubic ramus, closed, initial encounter (Multi)       Medical Decision Making  Parts of this chart have been completed using voice recognition software. Please excuse any errors of transcription.  My thought process and reason for plan has been formulated from the time that I saw the patient until the time of disposition and is not specific to one specific moment during their visit and furthermore my MDM encompasses this entire chart and not only this text box.      HPI: Detailed above.    Exam: A medically appropriate exam performed, outlined above, given the known history and presentation.    History obtained from: Patient    EKG: See my supervising physician's EKG interpretation    Medications given during visit:  Medications   acetaminophen (Tylenol) tablet 975 mg (975 mg oral Given 6/19/24 1250)        Diagnostic/tests  Labs Reviewed   CBC WITH AUTO DIFFERENTIAL - Abnormal       Result Value    WBC 7.0      nRBC 0.0      RBC 3.38 (*)     Hemoglobin 12.0      Hematocrit 36.2       (*)     MCH 35.5 (*)     MCHC 33.1      RDW 15.6 (*)     Platelets 507 (*)     Neutrophils %        Immature Granulocytes %, Automated        Lymphocytes %        Monocytes %        Eosinophils %        Basophils %        Neutrophils Absolute        Lymphocytes Absolute        Monocytes Absolute        Eosinophils Absolute        Basophils Absolute       BASIC METABOLIC PANEL      CT head wo IV contrast   Final Result   No CT evidence for acute intracranial pathology.        Air-fluid levels within the maxillary sinuses and right sphenoid   sinus suggesting acute sinusitis. There is no significant   mucoperiosteal thickening nor chronic sinusitis.        Signed by: Jitendra Zambrano 6/19/2024 2:03 PM   Dictation workstation:   [id number]      CT cervical spine wo IV contrast   Final Result   1. No acute fracture or traumatic malalignment.   2. Degenerative disc disease and  spondylosis as described in the body   of the report.             Signed by: Jitendra Zambrano 6/19/2024 2:17 PM   Dictation workstation:   [id number]      XR hips bilateral 5+ VW w pelvis when performed   Final Result   Acute fractures of the right superior ramus and right inferior ramus.        Hip joints are intact. No additional osseous abnormalities are   identified.        Signed by: Jitendra Zambrano 6/19/2024 1:54 PM   Dictation workstation:   [id number]           Considerations/further MDM:    Presents with stable vital signs.    Differential diagnosis includes but not limited to hip fracture versus hardware dislocation    X-ray shows right superior ramus and right inferior ramus fractures.  CT head and CT cervical spine were ordered due to patient falling and hitting her head.  No evidence of acute findings on CT head or CT cervical spine without IV contrast.  CBC shows mild thrombocytosis, no anemia or leukocytosis.  BMP is in process at time of admission.  Due to patient not being able to ambulate due to right pubic rami fracture, plan for admission.    The patient was evaluated in the emergency department and an etiology requiring emergent treatment or admission to hospital was identified.  The patient/family was counseled on clinical expression, expectations, and plan along with recommendations for admission.  All questions were answered and involved parties were understanding and agreeable to course of treatment.  Case was discussed with admitting physician and any consultants.  Bed type ED treatment and further ED work-up decided by joint decision making with admitting team and any consultants.  Patient stable for admission per my assessment and further management of patient will be deferred to the inpatient setting.      Procedure  Procedures     Ele Lozano PA-C  06/19/24 1561

## 2024-06-19 NOTE — H&P
History Of Present Illness  Victoria Trotter is a 74 y.o. female hx of myeloproliferative disorder, insomnia, Vit D deficiency, Vit B12 deficiency, HTN who presented with right hip pain after having a mechanical fall. Patient states she was at the park running after her grandson when she tripped and fell. States that she landed on there right hip but did hit the back of her head as well. Denies loss of consciousness. States she takes a baby aspirin daily but otherwise does not take any blood thinners. Patient was unable to get up after falling and EMS was called. Patient denies dizziness or lightheadedness preceding the fall.     In the ED, VSS. Labs showing unremarkable other than . XR hip showing acute fractures of the right superior ramus and right inferior ramus, hip joints are intact. CT head negative for acute intracranial pathology. CT c-spine negative for acute fracture or traumatic malalignment. Given tylenol 975mg x1.      Past Medical History  She has a past medical history of High cholesterol and Hypertension.    Surgical History  She has no past surgical history on file.     Social History  She reports that she quit smoking about 48 years ago. Her smoking use included cigarettes. She started smoking about 58 years ago. She has a 2.5 pack-year smoking history. She has been exposed to tobacco smoke. She has never used smokeless tobacco. She reports current alcohol use of about 2.0 standard drinks of alcohol per week. She reports that she does not use drugs.    Family History  Family History   Problem Relation Name Age of Onset    Breast cancer Mother      Throat cancer Father          Allergies  Patient has no known allergies.    Review of Systems   Constitutional:  Negative for fever.   HENT:  Negative for trouble swallowing.    Respiratory:  Negative for shortness of breath.    Cardiovascular:  Negative for chest pain.   Gastrointestinal:  Negative for nausea and vomiting.   Musculoskeletal:   Positive for arthralgias, gait problem and myalgias.   Skin:  Negative for rash.   Neurological:  Negative for dizziness.   Psychiatric/Behavioral:  Negative for confusion.         Physical Exam  Constitutional:       General: She is not in acute distress.     Appearance: She is not toxic-appearing.   HENT:      Head: Normocephalic.      Mouth/Throat:      Pharynx: Oropharynx is clear.   Eyes:      General: No scleral icterus.  Cardiovascular:      Rate and Rhythm: Normal rate.      Heart sounds: Murmur heard.   Pulmonary:      Effort: No respiratory distress.      Breath sounds: No wheezing.   Abdominal:      General: There is no distension.      Palpations: Abdomen is soft.   Musculoskeletal:         General: Tenderness (right hip) present.      Right lower leg: No edema.      Left lower leg: No edema.   Neurological:      Mental Status: She is alert and oriented to person, place, and time.   Psychiatric:         Behavior: Behavior normal.          Last Recorded Vitals  /68   Pulse 73   Temp 36.6 °C (97.9 °F) (Oral)   Resp 16   Wt 56.7 kg (125 lb)   SpO2 96%     Relevant Results        CT cervical spine wo IV contrast    Result Date: 6/19/2024  Interpreted By:  Jitendra Zambrano, STUDY: CT CERVICAL SPINE WO IV CONTRAST; 6/19/2024 1:39 pm   INDICATION: Signs/Symptoms:fall;   COMPARISON: None   ACCESSION NUMBER(S): [id number]   ORDERING CLINICIAN: VIRGIL THOMPSON   TECHNIQUE: Contiguous axial images were acquired from the skull base to the lung apices. Coronal and sagittal reformatted images were obtained. All CT examinations are performed with 1 or more of the following dose reduction techniques: Automated exposure control, adjustment of mA and/or kv according to patient's size, or use of iterative reconstruction techniques.   FINDINGS: There is straightening and slight reversal of the normal cervical lordosis.  No acute fracture or traumatic malalignment is identified. Facet degenerative changes and  uncovertebral joint degenerative changes are present. Trace degenerative anterolisthesis of C3 on C4 and C7 on T1. Otherwise adequate alignment     The occipital condyles, arch of C1, and the odontoid processes are intact. The atlantoaxial relationship is well maintained.   No evidence for bony spinal canal stenosis. There is moderate-severe left neural foramina stenosis at C3-4. Moderate-severe left neural foramina stenosis at C4-5 severe right neural foramina stenosis at C5-6. Moderate-severe left neural foramina stenosis at C5-6. Moderate-severe left neural foramina stenosis at C6-7.   The visualized lung apices are unremarkable.       1. No acute fracture or traumatic malalignment. 2. Degenerative disc disease and spondylosis as described in the body of the report.     Signed by: Jitendra Zambrano 6/19/2024 2:17 PM Dictation workstation:   [id number]    CT head wo IV contrast    Result Date: 6/19/2024  Interpreted By:  Jitendra Zambrano, STUDY: VIRGIL THOMPSON; 6/19/2024 1:39 pm   INDICATION: Signs/Symptoms:fall;   COMPARISON: None   ACCESSION NUMBER(S): [id number]   ORDERING CLINICIAN: VIRGIL THOMPSON   TECHNIQUE: Contiguous axial images were acquired from the vertex through the posterior fossa without IV contrast. All CT examinations are performed with 1 or more of the following dose reduction techniques: Automated exposure control, adjustment of mA and/or kv according to patient's size, or use of iterative reconstruction techniques.   FINDINGS: No focal mass effect or midline shift is identified. The ventricles and sulci are symmetric and appropriate for the patient's age.   The gray white matter differentiation is preserved.   No acute intracranial hemorrhage is seen. No intra-axial or extra-axial fluid collection is seen.   The visualized paranasal sinuses show air-fluid levels in the bilateral maxillary sinuses and right sphenoid sinus suggesting acute sinusitis. No evidence for chronic sinusitis. The  paranasal sinuses are otherwise clear without mucoperiosteal thickening. The mastoid air cells and middle ear cavities are clear bilaterally.       No CT evidence for acute intracranial pathology.   Air-fluid levels within the maxillary sinuses and right sphenoid sinus suggesting acute sinusitis. There is no significant mucoperiosteal thickening nor chronic sinusitis.   Signed by: Jitendra Zambrano 6/19/2024 2:03 PM Dictation workstation:   [id number]    XR hips bilateral 5+ VW w pelvis when performed    Result Date: 6/19/2024  Interpreted By:  Jitendra Zambrano, STUDY: XR HIPS BILATERAL 5+ VW WITH PELVIS WHEN PERFORMED; 6/19/2024 1:29 pm   INDICATION: Signs/Symptoms:pain.   COMPARISON: None available.   ACCESSION NUMBER(S): [id number]   ORDERING CLINICIAN: VIRGIL THOMPSON   TECHNIQUE: AP view of the pelvis. Additionally, AP and Lateral views of both the left and the right hips.   FINDINGS: Prior ORIF of the proximal femurs bilaterally.   Acute fractures of the right superior ramus and right inferior ramus. The remainder of the pelvic bones and SI joints are intact.   No other bony or soft tissue abnormality is identified. Prominent degenerative change of the visualized lower lumbar spine.       Acute fractures of the right superior ramus and right inferior ramus.   Hip joints are intact. No additional osseous abnormalities are identified.   Signed by: Jitendra Zambrano 6/19/2024 1:54 PM Dictation workstation:   [id number]    ECG 12 lead    Result Date: 6/19/2024  Normal sinus rhythm Normal ECG No previous ECGs available        Assessment/Plan   Principal Problem:    Fracture of right inferior pubic ramus, closed, initial encounter (Multi)      Acute right superior and inferior pubic rami fractures  Orthopedic surgery consult  Scheduled tylenol 1000mg TID  Oxycodone 5-10mg q4hrs PRN  Lidocaine patch  Bowel regimen  PT/OT  TCC/SWK consult for discharge planning  Fall precautions    Myeloproliferative  disorder  Continue home hydroxyurea    HTN  HLD  Continue home aspirin, atenolol, and lipitor    Insomnia  Home lunesta is not on formulary -> switch to ambien, continue nightly PRN    DVT ppx: lovenox  GI ppx: protonix    Plan:  Admit to obs RNF  Orthopedic surgery consult  Pain management  Bowel regimen  Continue home meds  PT/OT  Discharge planning           Daylin Brizuela MD

## 2024-06-20 ENCOUNTER — APPOINTMENT (OUTPATIENT)
Dept: CARDIOLOGY | Facility: HOSPITAL | Age: 74
End: 2024-06-20
Payer: MEDICARE

## 2024-06-20 PROBLEM — R26.2 AMBULATORY DYSFUNCTION: Status: ACTIVE | Noted: 2024-06-20

## 2024-06-20 LAB
ANION GAP SERPL CALC-SCNC: 11 MMOL/L
BUN SERPL-MCNC: 19 MG/DL (ref 8–25)
CALCIUM SERPL-MCNC: 9 MG/DL (ref 8.5–10.4)
CHLORIDE SERPL-SCNC: 106 MMOL/L (ref 97–107)
CO2 SERPL-SCNC: 24 MMOL/L (ref 24–31)
CREAT SERPL-MCNC: 0.8 MG/DL (ref 0.4–1.6)
EGFRCR SERPLBLD CKD-EPI 2021: 77 ML/MIN/1.73M*2
ERYTHROCYTE [DISTWIDTH] IN BLOOD BY AUTOMATED COUNT: 15.3 % (ref 11.5–14.5)
GLUCOSE SERPL-MCNC: 92 MG/DL (ref 65–99)
HCT VFR BLD AUTO: 34.6 % (ref 36–46)
HGB BLD-MCNC: 11.1 G/DL (ref 12–16)
MCH RBC QN AUTO: 35.1 PG (ref 26–34)
MCHC RBC AUTO-ENTMCNC: 32.1 G/DL (ref 32–36)
MCV RBC AUTO: 110 FL (ref 80–100)
NRBC BLD-RTO: 0 /100 WBCS (ref 0–0)
PLATELET # BLD AUTO: 377 X10*3/UL (ref 150–450)
POTASSIUM SERPL-SCNC: 4 MMOL/L (ref 3.4–5.1)
RBC # BLD AUTO: 3.16 X10*6/UL (ref 4–5.2)
SODIUM SERPL-SCNC: 141 MMOL/L (ref 133–145)
WBC # BLD AUTO: 5.6 X10*3/UL (ref 4.4–11.3)

## 2024-06-20 PROCEDURE — 36415 COLL VENOUS BLD VENIPUNCTURE: CPT | Performed by: STUDENT IN AN ORGANIZED HEALTH CARE EDUCATION/TRAINING PROGRAM

## 2024-06-20 PROCEDURE — 93005 ELECTROCARDIOGRAM TRACING: CPT

## 2024-06-20 PROCEDURE — 2500000004 HC RX 250 GENERAL PHARMACY W/ HCPCS (ALT 636 FOR OP/ED): Performed by: STUDENT IN AN ORGANIZED HEALTH CARE EDUCATION/TRAINING PROGRAM

## 2024-06-20 PROCEDURE — 80048 BASIC METABOLIC PNL TOTAL CA: CPT | Performed by: STUDENT IN AN ORGANIZED HEALTH CARE EDUCATION/TRAINING PROGRAM

## 2024-06-20 PROCEDURE — 85027 COMPLETE CBC AUTOMATED: CPT | Performed by: STUDENT IN AN ORGANIZED HEALTH CARE EDUCATION/TRAINING PROGRAM

## 2024-06-20 PROCEDURE — 97530 THERAPEUTIC ACTIVITIES: CPT | Mod: GP

## 2024-06-20 PROCEDURE — G0378 HOSPITAL OBSERVATION PER HR: HCPCS

## 2024-06-20 PROCEDURE — 97162 PT EVAL MOD COMPLEX 30 MIN: CPT | Mod: GP

## 2024-06-20 PROCEDURE — 96372 THER/PROPH/DIAG INJ SC/IM: CPT | Performed by: STUDENT IN AN ORGANIZED HEALTH CARE EDUCATION/TRAINING PROGRAM

## 2024-06-20 PROCEDURE — 2500000001 HC RX 250 WO HCPCS SELF ADMINISTERED DRUGS (ALT 637 FOR MEDICARE OP): Performed by: STUDENT IN AN ORGANIZED HEALTH CARE EDUCATION/TRAINING PROGRAM

## 2024-06-20 PROCEDURE — 2500000005 HC RX 250 GENERAL PHARMACY W/O HCPCS: Performed by: STUDENT IN AN ORGANIZED HEALTH CARE EDUCATION/TRAINING PROGRAM

## 2024-06-20 ASSESSMENT — COGNITIVE AND FUNCTIONAL STATUS - GENERAL
STANDING UP FROM CHAIR USING ARMS: A LITTLE
PERSONAL GROOMING: A LITTLE
WALKING IN HOSPITAL ROOM: A LOT
HELP NEEDED FOR BATHING: A LOT
DRESSING REGULAR LOWER BODY CLOTHING: TOTAL
HELP NEEDED FOR BATHING: A LITTLE
WALKING IN HOSPITAL ROOM: A LITTLE
DRESSING REGULAR LOWER BODY CLOTHING: A LITTLE
PERSONAL GROOMING: A LITTLE
WALKING IN HOSPITAL ROOM: TOTAL
MOBILITY SCORE: 11
MOVING FROM LYING ON BACK TO SITTING ON SIDE OF FLAT BED WITH BEDRAILS: A LITTLE
MOBILITY SCORE: 16
STANDING UP FROM CHAIR USING ARMS: A LITTLE
MOVING FROM LYING ON BACK TO SITTING ON SIDE OF FLAT BED WITH BEDRAILS: A LITTLE
MOVING TO AND FROM BED TO CHAIR: TOTAL
DRESSING REGULAR UPPER BODY CLOTHING: A LOT
MOBILITY SCORE: 18
DRESSING REGULAR LOWER BODY CLOTHING: A LITTLE
DAILY ACTIVITIY SCORE: 13
DAILY ACTIVITIY SCORE: 19
PERSONAL GROOMING: A LITTLE
TOILETING: A LITTLE
TURNING FROM BACK TO SIDE WHILE IN FLAT BAD: A LITTLE
TURNING FROM BACK TO SIDE WHILE IN FLAT BAD: A LITTLE
CLIMB 3 TO 5 STEPS WITH RAILING: A LOT
TURNING FROM BACK TO SIDE WHILE IN FLAT BAD: A LITTLE
CLIMB 3 TO 5 STEPS WITH RAILING: A LITTLE
MOVING TO AND FROM BED TO CHAIR: A LITTLE
STANDING UP FROM CHAIR USING ARMS: A LOT
TOILETING: A LITTLE
DRESSING REGULAR UPPER BODY CLOTHING: A LITTLE
CLIMB 3 TO 5 STEPS WITH RAILING: TOTAL
TOILETING: TOTAL
DAILY ACTIVITIY SCORE: 19
MOVING TO AND FROM BED TO CHAIR: A LITTLE
HELP NEEDED FOR BATHING: A LITTLE
DRESSING REGULAR UPPER BODY CLOTHING: A LITTLE
MOVING FROM LYING ON BACK TO SITTING ON SIDE OF FLAT BED WITH BEDRAILS: A LITTLE

## 2024-06-20 ASSESSMENT — PAIN SCALES - GENERAL
PAINLEVEL_OUTOF10: 3
PAINLEVEL_OUTOF10: 7
PAINLEVEL_OUTOF10: 5 - MODERATE PAIN
PAINLEVEL_OUTOF10: 0 - NO PAIN
PAINLEVEL_OUTOF10: 5 - MODERATE PAIN
PAINLEVEL_OUTOF10: 5 - MODERATE PAIN
PAINLEVEL_OUTOF10: 7

## 2024-06-20 ASSESSMENT — PAIN - FUNCTIONAL ASSESSMENT
PAIN_FUNCTIONAL_ASSESSMENT: 0-10

## 2024-06-20 ASSESSMENT — ACTIVITIES OF DAILY LIVING (ADL)
ADL_ASSISTANCE: INDEPENDENT
BATHING_ASSISTANCE: MAXIMAL
ADL_ASSISTANCE: INDEPENDENT

## 2024-06-20 ASSESSMENT — PAIN SCALES - WONG BAKER
WONGBAKER_NUMERICALRESPONSE: HURTS LITTLE MORE
WONGBAKER_NUMERICALRESPONSE: HURTS WHOLE LOT

## 2024-06-20 ASSESSMENT — PAIN DESCRIPTION - DESCRIPTORS: DESCRIPTORS: ACHING

## 2024-06-20 NOTE — CONSULTS
"Reason For Consult  Fall with right hip pain    History Of Present Illness  Victoria Trotter is a 74 y.o. female presenting with fall.  She was watching her grandchild who is running away from her.  She tried to reach her arm and fell landing on her right side.  She was unable to get up afterwards.  Denied any antecedent hip pain.  Prior history of atypical femur fracture with intramedullary edgardo.  Current pain is in the groin.  Does not radiate.  No associated paresthesia.  Normally independent in her daily activities.  Denies pain elsewhere.     Past Medical History  She has a past medical history of High cholesterol and Hypertension.    Surgical History  She has no past surgical history on file.     Social History  She reports that she quit smoking about 48 years ago. Her smoking use included cigarettes. She started smoking about 58 years ago. She has a 2.5 pack-year smoking history. She has been exposed to tobacco smoke. She has never used smokeless tobacco. She reports current alcohol use of about 2.0 standard drinks of alcohol per week. She reports that she does not use drugs.    Family History  Family History   Problem Relation Name Age of Onset    Breast cancer Mother      Throat cancer Father          Allergies  Patient has no known allergies.    Review of Systems  As above     Physical Exam  Alert and oriented x 3  Right lower extremity: Incisions about the proximal hip intact.  Minimal swelling.  No tenderness over the lateral trochanteric margin or lateral thigh.  Tolerates logroll exam.  Active hip flexion and extension preserved but limited by pain.  No tenderness about the knee ankle or foot.  Compartments in the leg are soft.    General musculoskeletal exam of the upper extremities left lower extremity within normal limits no pain to palpation.     Last Recorded Vitals  Blood pressure 109/68, pulse 75, temperature 36.6 °C (97.9 °F), temperature source Oral, resp. rate 17, height 1.499 m (4' 11\"), weight " 56.7 kg (125 lb), SpO2 98%.    Relevant Results    I reviewed x-ray imaging report of her pelvis and right and left hip.  Intramedullary rods of the right and left proximal femur.  Distal end of the rods not visualized.  Acute fractures of the right superior and inferior pubic ramus.  No other fractures identified.  Mild displacement.    Scheduled medications  acetaminophen, 975 mg, oral, q8h  ascorbic acid, 500 mg, oral, Daily  aspirin, 81 mg, oral, Daily  atenolol, 25 mg, oral, Daily  atorvastatin, 20 mg, oral, Nightly  cholecalciferol, 1,000 Units, oral, Daily  cyanocobalamin, 1,000 mcg, oral, Daily  enoxaparin, 40 mg, subcutaneous, Daily  escitalopram, 20 mg, oral, Daily  gabapentin, 100 mg, oral, BID  hydroxyurea, 500 mg, oral, Daily  lidocaine, 1 patch, transdermal, Daily  pantoprazole, 40 mg, oral, Daily before breakfast   Or  pantoprazole, 40 mg, intravenous, Daily before breakfast  polyethylene glycol, 17 g, oral, Daily      Continuous medications     PRN medications  PRN medications: benzocaine-menthol, dextromethorphan-guaifenesin, fluticasone, oxyCODONE **OR** oxyCODONE, zolpidem  Results for orders placed or performed during the hospital encounter of 06/19/24 (from the past 24 hour(s))   ECG 12 lead   Result Value Ref Range    Ventricular Rate 70 BPM    Atrial Rate 70 BPM    UT Interval 154 ms    QRS Duration 86 ms    QT Interval 386 ms    QTC Calculation(Bazett) 416 ms    P Axis 36 degrees    R Axis 14 degrees    T Axis 9 degrees    QRS Count 12 beats    Q Onset 226 ms    P Onset 149 ms    P Offset 194 ms    T Offset 419 ms    QTC Fredericia 406 ms   CBC and Auto Differential   Result Value Ref Range    WBC 7.0 4.4 - 11.3 x10*3/uL    nRBC 0.0 0.0 - 0.0 /100 WBCs    RBC 3.38 (L) 4.00 - 5.20 x10*6/uL    Hemoglobin 12.0 12.0 - 16.0 g/dL    Hematocrit 36.2 36.0 - 46.0 %     (H) 80 - 100 fL    MCH 35.5 (H) 26.0 - 34.0 pg    MCHC 33.1 32.0 - 36.0 g/dL    RDW 15.6 (H) 11.5 - 14.5 %    Platelets 507 (H)  150 - 450 x10*3/uL    Immature Granulocytes %, Automated 8.0 (H) 0.0 - 0.9 %    Immature Granulocytes Absolute, Automated 0.56 (H) 0.00 - 0.50 x10*3/uL   Manual Differential   Result Value Ref Range    Neutrophils %, Manual 50.0 40.0 - 80.0 %    Bands %, Manual 3.0 0.0 - 5.0 %    Lymphocytes %, Manual 30.0 13.0 - 44.0 %    Monocytes %, Manual 5.0 2.0 - 10.0 %    Eosinophils %, Manual 5.0 0.0 - 6.0 %    Basophils %, Manual 2.0 0.0 - 2.0 %    Atypical Lymphocytes %, Manual 3.0 0.0 - 2.0 %    Metamyelocytes %, Manual 2.0 0.0 - 0.0 %    Seg Neutrophils Absolute, Manual 3.50 1.60 - 5.00 x10*3/uL    Bands Absolute, Manual 0.21 0.00 - 0.50 x10*3/uL    Lymphocytes Absolute, Manual 2.10 0.80 - 3.00 x10*3/uL    Monocytes Absolute, Manual 0.35 0.05 - 0.80 x10*3/uL    Eosinophils Absolute, Manual 0.35 0.00 - 0.40 x10*3/uL    Basophils Absolute, Manual 0.14 (H) 0.00 - 0.10 x10*3/uL    Atypical Lymphs Absolute, Manual 0.21 0.00 - 0.30 x10*3/uL    Metamyelocytes Absolute, Manual 0.14 0.00 - 0.00 x10*3/uL    Total Cells Counted 100     Neutrophils Absolute, Manual 3.71 1.60 - 5.50 x10*3/uL    RBC Morphology See Below     Polychromasia Mild     Hypochromia Mild     RBC Fragments Few     Ovalocytes Few     Teardrop Cells Few     Basophilic Stippling Present     Giant Platelets Few    Basic metabolic panel   Result Value Ref Range    Glucose 106 (H) 65 - 99 mg/dL    Sodium 140 133 - 145 mmol/L    Potassium 4.5 3.4 - 5.1 mmol/L    Chloride 103 97 - 107 mmol/L    Bicarbonate 27 24 - 31 mmol/L    Urea Nitrogen 23 8 - 25 mg/dL    Creatinine 0.90 0.40 - 1.60 mg/dL    eGFR 67 >60 mL/min/1.73m*2    Calcium 9.7 8.5 - 10.4 mg/dL    Anion Gap 10 <=19 mmol/L   CBC   Result Value Ref Range    WBC 5.6 4.4 - 11.3 x10*3/uL    nRBC 0.0 0.0 - 0.0 /100 WBCs    RBC 3.16 (L) 4.00 - 5.20 x10*6/uL    Hemoglobin 11.1 (L) 12.0 - 16.0 g/dL    Hematocrit 34.6 (L) 36.0 - 46.0 %     (H) 80 - 100 fL    MCH 35.1 (H) 26.0 - 34.0 pg    MCHC 32.1 32.0 - 36.0  g/dL    RDW 15.3 (H) 11.5 - 14.5 %    Platelets 377 150 - 450 x10*3/uL   Basic metabolic panel   Result Value Ref Range    Glucose 92 65 - 99 mg/dL    Sodium 141 133 - 145 mmol/L    Potassium 4.0 3.4 - 5.1 mmol/L    Chloride 106 97 - 107 mmol/L    Bicarbonate 24 24 - 31 mmol/L    Urea Nitrogen 19 8 - 25 mg/dL    Creatinine 0.80 0.40 - 1.60 mg/dL    eGFR 77 >60 mL/min/1.73m*2    Calcium 9.0 8.5 - 10.4 mg/dL    Anion Gap 11 <=19 mmol/L        Assessment/Plan     Pelvic ring injury: Sustained a pubic rami fracture after fall.  Recommend weightbearing as tolerated.  Pain controlled.  Ice in the groin area for comfort.  PT OT evaluation.  Home versus rehab pending PT assessment.  Follow-up with me in 6 weeks.  Call for questions.    Jose Gomez MD

## 2024-06-20 NOTE — PROGRESS NOTES
Spiritual Care Visit    Clinical Encounter Type  Visited With: Patient  Routine Visit: Introduction  Continue Visiting: Yes         Values/Beliefs  Spiritual Requests During Hospitalization: Communion today    Sacramental Encounters  Communion: Patient wants communion  Communion Given Indicator: Yes     Denver Delgado

## 2024-06-20 NOTE — CARE PLAN
The patient's goals for the shift include Pain management and comfort    The clinical goals for the shift include pain management, safety      Problem: Fall/Injury  Goal: Not fall by end of shift  Outcome: Progressing  Goal: Be free from injury by end of the shift  Outcome: Progressing  Goal: Verbalize understanding of personal risk factors for fall in the hospital  Outcome: Progressing  Goal: Verbalize understanding of risk factor reduction measures to prevent injury from fall in the home  Outcome: Progressing  Goal: Use assistive devices by end of the shift  Outcome: Progressing  Goal: Pace activities to prevent fatigue by end of the shift  Outcome: Progressing     Problem: HP General Problem  Goal: HP General Goal  Outcome: Progressing

## 2024-06-20 NOTE — CARE PLAN
Problem: Fall/Injury  Goal: Not fall by end of shift  Outcome: Progressing     Problem: Fall/Injury  Goal: Be free from injury by end of the shift  Outcome: Progressing     Problem: Fall/Injury  Goal: Use assistive devices by end of the shift  Outcome: Progressing   The patient's goals for the shift include Pain management and comfort    The clinical goals for the shift include pain management, safety    Over the shift, the patient did make progress toward the following goals.

## 2024-06-20 NOTE — PROGRESS NOTES
Physical Therapy    Physical Therapy Evaluation & Treatment    Patient Name: Victoria Trotter  MRN: [id number]  Today's Date: 6/20/2024   Time Calculation  Start Time: 0930  Stop Time: 0957  Time Calculation (min): 27 min    Assessment/Plan   PT Assessment  PT Assessment Results: Decreased strength, Decreased range of motion, Decreased endurance, Impaired balance, Decreased mobility, Decreased coordination, Pain  Rehab Prognosis: Good  Evaluation/Treatment Tolerance: Patient tolerated treatment well, Patient limited by pain  Medical Staff Made Aware: Yes  Strengths: Ability to acquire knowledge, Attitude of self, Support of extended family/friends  Barriers to Participation: Comorbidities  End of Session Communication: Bedside nurse  Assessment Comment: pt demonstrates decline in functional mobility compared to baseline level; pt with impaired hip ROM, strength, and overall limited activity tolerance secondary to pelvic/hip pain; pt is a high falls risk and would benefit from continued skilled physical therapy during hopsital stay and upon discharge to address the above concerns.  End of Session Patient Position: Bed, 3 rail up, Alarm on (call bell in reach, all needs met. RN aware)   IP OR SWING BED PT PLAN  Inpatient or Swing Bed: Inpatient  PT Plan  Treatment/Interventions: Bed mobility, Transfer training, Gait training, Stair training, Balance training, Neuromuscular re-education, Strengthening, Endurance training, Range of motion, Therapeutic exercise, Therapeutic activity, Home exercise program, Positioning  PT Plan: Ongoing PT  PT Frequency: Daily  PT Discharge Recommendations: Moderate intensity level of continued care  Equipment Recommended upon Discharge: Wheeled walker  PT Recommended Transfer Status: Assist x2  PT - OK to Discharge: Yes (to next appropriate level of skilled care)      Subjective     General Visit Information:  General  Reason for Referral: impaired mobility (pt is a 73 y/o female admitted to  [facility] on 6/19 with R hip pain s/p fall fabiola andrade; x-ray shows acute fx of R superior ramus and R inferior ramus)  Referred By: Daylin Brizuela MD  Past Medical History Relevant to Rehab: myeloproliferative disorder, insomnia, Vit D deficiency, Vit B12 deficiency, HTN  Family/Caregiver Present: Yes  Caregiver Feedback: dtr at bedside; addressed all therapy related questions and concerns  Co-Treatment: OT  Co-Treatment Reason: pt requires two-person skilled assist for safe pt handling  Prior to Session Communication: Bedside nurse  Patient Position Received: Bed, 3 rail up, Alarm off, not on at start of session  Preferred Learning Style: verbal, visual  General Comment: pt cleared for therapy via RN, agreeable to participate, received supine in bed; (+) purewick  Home Living:  Home Living  Type of Home: House  Lives With: Spouse  Home Adaptive Equipment: Cane, Other (Comment) (rollator)  Home Layout: One level  Home Access: Stairs to enter without rails  Entrance Stairs-Rails: None  Entrance Stairs-Number of Steps: 1  Bathroom Shower/Tub: Walk-in shower  Bathroom Toilet: Handicapped height  Bathroom Equipment: Shower chair with back  Prior Level of Function:  Prior Function Per Pt/Caregiver Report  Level of Noti: Independent with ADLs and functional transfers, Independent with homemaking with ambulation  ADL Assistance: Independent  Homemaking Assistance: Independent  Laundry: Independent  Ambulatory Assistance: Independent  Prior Function Comments: pt reports 1 fall in past 6 months; pt broke L femur 2 years ago with edgardo placement in bilateral LEs  Precautions:  Precautions  Hearing/Visual Limitations: wears glasses  LE Weight Bearing Status: Weight Bearing as Tolerated  Medical Precautions: Fall precautions         Objective   Pain:  Pain Assessment  Pain Assessment: 0-10  0-10 (Numeric) Pain Score: 5 - Moderate pain  Pain Type: Acute pain  Pain Location: Hip  Pain Orientation: Right  Pain  Interventions:  (mobility and repositioning; pt requesting pain meds - RN informed)  Response to Interventions: slight increase in pain with mobility  Cognition:  Cognition  Overall Cognitive Status: Within Functional Limits  Orientation Level: Oriented X4  Attention: Within Functional Limits  Memory: Within Funtional Limits  Safety/Judgement: Within Functional Limits  Processing Speed: Within funtional limits    General Assessments:  General Observation  General Observation: pt is alert, oriented and cooperative       Activity Tolerance  Endurance: Decreased tolerance for upright activites  Activity Tolerance Comments: increased pain with all functional mobility  Rate of Perceived Exertion (RPE): 4-5/10    Sensation  Sensation Comment: (+) numbness/tingling B LEs    Strength  Strength Comments: L LE grossly >3+/5; R LE with full AROM ankle and knee; R hip flexion 2+/5  Strength  Strength Comments: L LE grossly >3+/5; R LE with full AROM ankle and knee; R hip flexion 2+/5         Coordination  Movements are Fluid and Coordinated: No  Coordination Comment: decreased rate/accuracy of movement B LEs    Postural Control  Postural Control: Impaired  Posture Comment: mild fwd head and rounded shoulders with sitting EOB    Static Sitting Balance  Static Sitting-Balance Support: Bilateral upper extremity supported, Feet supported  Static Sitting-Level of Assistance: Close supervision  Static Sitting-Comment/Number of Minutes: Good- static sitting on EOB with CS for safety ~3 minutes    Static Standing Balance  Static Standing-Balance Support: Bilateral upper extremity supported (on RW)  Static Standing-Level of Assistance: Minimum assistance  Static Standing-Comment/Number of Minutes: pt stood with min A x 1-2 for stability and safety with use of RW  Functional Assessments:       Bed Mobility  Bed Mobility: Yes  Bed Mobility 1  Bed Mobility 1: Supine to sitting  Level of Assistance 1: Minimum assistance  Bed Mobility  Comments 1: supine > sit with min A x 1 to assist with R LE off bed; HOB elevated ~30*; mod I with use of grab bars to elevate trunk; increased time/effort required for transition  Bed Mobility 2  Bed Mobility  2: Sitting to supine  Level of Assistance 2: +2, Maximum assistance  Bed Mobility Comments 2: sit > supine with max A x 2 for trunk down and B LEs onto bed; min A x 1 to scoot in bed for improved alignment and positioning    Transfers  Transfer: Yes  Transfer 1  Transfer From 1: Sit to  Transfer to 1: Stand  Technique 1: Sit to stand  Transfer Device 1: Walker  Transfer Level of Assistance 1: Moderate assistance, +2  Trials/Comments 1: sit > stand with mod A x 2 for fwd weight shift; VCs for safe hand placement and R LE placement  Transfers 2  Transfer From 2: Stand to  Transfer to 2: Sit  Technique 2: Stand to sit  Transfer Device 2: Walker  Transfer Level of Assistance 2: +2, Moderate assistance  Trials/Comments 2: stand > sit with mod A x 2 for controlled eccentric lowering onto EOB; VCs for safe hand and R LE placement; increased time/effort required for transition secondary to increased pain    Ambulation/Gait Training  Ambulation/Gait Training Performed: Yes  Ambulation/Gait Training 1  Surface 1: Level tile  Device 1: Rolling walker  Assistance 1: Maximum assistance (+2)  Quality of Gait 1:  (step-to pattern, NBOS, decreased gorge)  Comments/Distance (ft) 1: pt took 3 side steps to R side with max A x 2 for stability, safety and walker management; pt pivoting R LE due to inability to lift and advance it; VCs for weight shift and proper walker management.    Stairs  Stairs: No  Extremity/Trunk Assessments:  RLE   RLE : Exceptions to WFL (R LE with full AROM ankle and knee; R hip flexion 2+/5)  LLE   LLE : Within Functional Limits  Treatments:  Therapeutic Exercise  Therapeutic Exercise Performed: Yes (reviewed importance of mobility and ther ex during hospital stay to prevent blood clots and increased  stiffness; reviewed supine APs, QSs, and GSs x5 reps each)       Bed Mobility  Bed Mobility: Yes  Bed Mobility 1  Bed Mobility 1: Supine to sitting  Level of Assistance 1: Minimum assistance  Bed Mobility Comments 1: supine > sit with min A x 1 to assist with R LE off bed; HOB elevated ~30*; mod I with use of grab bars to elevate trunk; increased time/effort required for transition  Bed Mobility 2  Bed Mobility  2: Sitting to supine  Level of Assistance 2: +2, Maximum assistance  Bed Mobility Comments 2: sit > supine with max A x 2 for trunk down and B LEs onto bed; min A x 1 to scoot in bed for improved alignment and positioning    Ambulation/Gait Training  Ambulation/Gait Training Performed: Yes  Ambulation/Gait Training 1  Surface 1: Level tile  Device 1: Rolling walker  Assistance 1: Maximum assistance (+2)  Quality of Gait 1:  (step-to pattern, NBOS, decreased gorge)  Comments/Distance (ft) 1: pt took 3 side steps to R side with max A x 2 for stability, safety and walker management; pt pivoting R LE due to inability to lift and advance it; VCs for weight shift and proper walker management.  Transfers  Transfer: Yes  Transfer 1  Transfer From 1: Sit to  Transfer to 1: Stand  Technique 1: Sit to stand  Transfer Device 1: Walker  Transfer Level of Assistance 1: Moderate assistance, +2  Trials/Comments 1: sit > stand with mod A x 2 for fwd weight shift; VCs for safe hand placement and R LE placement  Transfers 2  Transfer From 2: Stand to  Transfer to 2: Sit  Technique 2: Stand to sit  Transfer Device 2: Walker  Transfer Level of Assistance 2: +2, Moderate assistance  Trials/Comments 2: stand > sit with mod A x 2 for controlled eccentric lowering onto EOB; VCs for safe hand and R LE placement; increased time/effort required for transition secondary to increased pain    Stairs  Stairs: No       Outcome Measures:  [facility] Basic Mobility  Turning from your back to your side while in a flat bed without using bedrails:  A little  Moving from lying on your back to sitting on the side of a flat bed without using bedrails: A little  Moving to and from bed to chair (including a wheelchair): Total  Standing up from a chair using your arms (e.g. wheelchair or bedside chair): A lot  To walk in hospital room: Total  Climbing 3-5 steps with railing: Total  Basic Mobility - Total Score: 11    Encounter Problems       Encounter Problems (Active)       Mobility       LTG - Patient will demonstrate ability to ambulate community distance with LRAD        Start:  06/20/24    Expected End:  07/04/24            STG - Patient will ambulate 250' with use of rolling walker and CGA        Start:  06/20/24    Expected End:  07/04/24            STG - Patient will ascend/descend a curb with use of rolling walker and CGA        Start:  06/20/24    Expected End:  07/04/24                 PT Transfers       LTG - Patient will demonstrate safe transfer techniques       Start:  06/20/24    Expected End:  07/04/24            STG - Patient will transfer sit <> stand with close supervision and use of rolling walker upon standing        Start:  06/20/24    Expected End:  07/04/24               Pain              Education Documentation  Precautions, taught by Andrez Bertrand, PT at 6/20/2024 11:59 AM.  Learner: Patient  Readiness: Acceptance  Method: Explanation, Demonstration  Response: Needs Reinforcement    Body Mechanics, taught by Andrez Bertrand, PT at 6/20/2024 11:59 AM.  Learner: Patient  Readiness: Acceptance  Method: Explanation, Demonstration  Response: Needs Reinforcement    Home Exercise Program, taught by Andrez Bertrand, PT at 6/20/2024 11:59 AM.  Learner: Patient  Readiness: Acceptance  Method: Explanation, Demonstration  Response: Needs Reinforcement    Mobility Training, taught by Andrez Bertrand, PT at 6/20/2024 11:59 AM.  Learner: Patient  Readiness: Acceptance  Method: Explanation, Demonstration  Response: Needs  Reinforcement    Education Comments  No comments found.

## 2024-06-20 NOTE — PROGRESS NOTES
06/20/24 1110   Discharge Planning   Living Arrangements Spouse/significant other   Support Systems Spouse/significant other   Assistance Needed prior to admission, independent, denies use of DME. Patient drives   Type of Residence Private residence   Home or Post Acute Services Post acute facilities (Rehab/SNF/etc)   Type of Post Acute Facility Services Skilled nursing     Met with patient at bedside to discuss discharge planning, patient states she is independent with ADLs and IADLs, Patient drives, patient fell at home resulted fracture of right inferior pubic ramus closed. PT/OT to [facility].    1347 PT/OT rec. MOD for SNF, SNF preference list given to patient, patient chose [facility], referral sent via [facility] to [facility], awaiting acceptance.

## 2024-06-20 NOTE — PROGRESS NOTES
Victoria Trotter is a 74 y.o. female on day 1 of admission presenting with Fracture of right inferior pubic ramus, closed, initial encounter (Multi).      Subjective   Patient seen and examined this morning on rounds.  She is lying somewhat upright in bed.  No acute distress.  States her pain has been manageable but just recently got out of bed with PT which exacerbated the pain.  No other concerns.  AVSS.  No events overnight       Objective     Last Recorded Vitals  /68 (BP Location: Left arm, Patient Position: Lying)   Pulse 75   Temp 36.6 °C (97.9 °F) (Oral)   Resp 17   Wt 56.7 kg (125 lb)   SpO2 98%   Intake/Output last 3 Shifts:    Intake/Output Summary (Last 24 hours) at 6/20/2024 1400  Last data filed at 6/20/2024 0630  Gross per 24 hour   Intake 240 ml   Output 300 ml   Net -60 ml       Admission Weight  Weight: 56.7 kg (125 lb) (06/19/24 1227)    Daily Weight  06/19/24 : 56.7 kg (125 lb)    Image Results  CT cervical spine wo IV contrast  Narrative: Interpreted By:  Jitendra Zambrano,   STUDY:  CT CERVICAL SPINE WO IV CONTRAST; 6/19/2024 1:39 pm      INDICATION:  Signs/Symptoms:fall;      COMPARISON:  None      ACCESSION NUMBER(S):  [id number]      ORDERING CLINICIAN:  VIRGIL THOMPSON      TECHNIQUE:  Contiguous axial images were acquired from the skull base to the lung  apices. Coronal and sagittal reformatted images were obtained. All CT  examinations are performed with 1 or more of the following dose  reduction techniques: Automated exposure control, adjustment of mA  and/or kv according to patient's size, or use of iterative  reconstruction techniques.      FINDINGS:  There is straightening and slight reversal of the normal cervical  lordosis.  No acute fracture or traumatic malalignment is identified.  Facet degenerative changes and uncovertebral joint degenerative  changes are present. Trace degenerative anterolisthesis of C3 on C4  and C7 on T1. Otherwise adequate alignment          The  occipital condyles, arch of C1, and the odontoid processes are  intact. The atlantoaxial relationship is well maintained.      No evidence for bony spinal canal stenosis. There is moderate-severe  left neural foramina stenosis at C3-4. Moderate-severe left neural  foramina stenosis at C4-5 severe right neural foramina stenosis at  C5-6. Moderate-severe left neural foramina stenosis at C5-6.  Moderate-severe left neural foramina stenosis at C6-7.      The visualized lung apices are unremarkable.      Impression: 1. No acute fracture or traumatic malalignment.  2. Degenerative disc disease and spondylosis as described in the body  of the report.          Signed by: Jitendra Zambrano 6/19/2024 2:17 PM  Dictation workstation:   [id number]  CT head wo IV contrast  Narrative: Interpreted By:  Jitendra Zambrano,   STUDY:  VIRGIL THOMPSON; 6/19/2024 1:39 pm      INDICATION:  Signs/Symptoms:fall;      COMPARISON:  None      ACCESSION NUMBER(S):  [id number]      ORDERING CLINICIAN:  VIRGIL THOMPSON      TECHNIQUE:  Contiguous axial images were acquired from the vertex through the  posterior fossa without IV contrast. All CT examinations are  performed with 1 or more of the following dose reduction techniques:  Automated exposure control, adjustment of mA and/or kv according to  patient's size, or use of iterative reconstruction techniques.      FINDINGS:  No focal mass effect or midline shift is identified. The ventricles  and sulci are symmetric and appropriate for the patient's age.      The gray white matter differentiation is preserved.      No acute intracranial hemorrhage is seen. No intra-axial or  extra-axial fluid collection is seen.      The visualized paranasal sinuses show air-fluid levels in the  bilateral maxillary sinuses and right sphenoid sinus suggesting acute  sinusitis. No evidence for chronic sinusitis. The paranasal sinuses  are otherwise clear without mucoperiosteal thickening. The mastoid  air  cells and middle ear cavities are clear bilaterally.      Impression: No CT evidence for acute intracranial pathology.      Air-fluid levels within the maxillary sinuses and right sphenoid  sinus suggesting acute sinusitis. There is no significant  mucoperiosteal thickening nor chronic sinusitis.      Signed by: Jitendra Zambrano 6/19/2024 2:03 PM  Dictation workstation:   [id number]  XR hips bilateral 5+ VW w pelvis when performed  Narrative: Interpreted By:  Jitendra Zambrano,   STUDY:  XR HIPS BILATERAL 5+ VW WITH PELVIS WHEN PERFORMED; 6/19/2024 1:29 pm      INDICATION:  Signs/Symptoms:pain.      COMPARISON:  None available.      ACCESSION NUMBER(S):  [id number]      ORDERING CLINICIAN:  VIRGIL THOMPSON      TECHNIQUE:  AP view of the pelvis. Additionally, AP and Lateral views of both the  left and the right hips.      FINDINGS:  Prior ORIF of the proximal femurs bilaterally.      Acute fractures of the right superior ramus and right inferior ramus.  The remainder of the pelvic bones and SI joints are intact.      No other bony or soft tissue abnormality is identified. Prominent  degenerative change of the visualized lower lumbar spine.      Impression: Acute fractures of the right superior ramus and right inferior ramus.      Hip joints are intact. No additional osseous abnormalities are  identified.      Signed by: Jitendra Zambrano 6/19/2024 1:54 PM  Dictation workstation:   [id number]  ECG 12 lead  Normal sinus rhythm  Normal ECG  No previous ECGs available      Physical Exam  Vitals and nursing note reviewed.   Constitutional:       General: She is not in acute distress.     Appearance: She is normal weight. She is not toxic-appearing.   HENT:      Head: Normocephalic and atraumatic.   Eyes:      Extraocular Movements: Extraocular movements intact.      Pupils: Pupils are equal, round, and reactive to light.   Cardiovascular:      Rate and Rhythm: Normal rate and regular rhythm.      Heart sounds:  Normal heart sounds.   Pulmonary:      Effort: Pulmonary effort is normal.      Breath sounds: Normal breath sounds.   Musculoskeletal:         General: No deformity.      Cervical back: Normal range of motion and neck supple. No tenderness.   Skin:     General: Skin is warm and dry.   Neurological:      General: No focal deficit present.      Mental Status: She is alert and oriented to person, place, and time.      Sensory: No sensory deficit.      Motor: No weakness.   Psychiatric:         Mood and Affect: Mood normal.         Behavior: Behavior normal.         Relevant Results             Scheduled medications  acetaminophen, 975 mg, oral, q8h  ascorbic acid, 500 mg, oral, Daily  aspirin, 81 mg, oral, Daily  atenolol, 25 mg, oral, Daily  atorvastatin, 20 mg, oral, Nightly  cholecalciferol, 1,000 Units, oral, Daily  cyanocobalamin, 1,000 mcg, oral, Daily  enoxaparin, 40 mg, subcutaneous, Daily  escitalopram, 20 mg, oral, Daily  gabapentin, 100 mg, oral, BID  hydroxyurea, 500 mg, oral, Daily  lidocaine, 1 patch, transdermal, Daily  pantoprazole, 40 mg, oral, Daily before breakfast   Or  pantoprazole, 40 mg, intravenous, Daily before breakfast  polyethylene glycol, 17 g, oral, Daily      Continuous medications     PRN medications  PRN medications: benzocaine-menthol, dextromethorphan-guaifenesin, fluticasone, oxyCODONE **OR** oxyCODONE, zolpidem    Results for orders placed or performed during the hospital encounter of 06/19/24 (from the past 24 hour(s))   Basic metabolic panel   Result Value Ref Range    Glucose 106 (H) 65 - 99 mg/dL    Sodium 140 133 - 145 mmol/L    Potassium 4.5 3.4 - 5.1 mmol/L    Chloride 103 97 - 107 mmol/L    Bicarbonate 27 24 - 31 mmol/L    Urea Nitrogen 23 8 - 25 mg/dL    Creatinine 0.90 0.40 - 1.60 mg/dL    eGFR 67 >60 mL/min/1.73m*2    Calcium 9.7 8.5 - 10.4 mg/dL    Anion Gap 10 <=19 mmol/L   CBC   Result Value Ref Range    WBC 5.6 4.4 - 11.3 x10*3/uL    nRBC 0.0 0.0 - 0.0 /100 WBCs     RBC 3.16 (L) 4.00 - 5.20 x10*6/uL    Hemoglobin 11.1 (L) 12.0 - 16.0 g/dL    Hematocrit 34.6 (L) 36.0 - 46.0 %     (H) 80 - 100 fL    MCH 35.1 (H) 26.0 - 34.0 pg    MCHC 32.1 32.0 - 36.0 g/dL    RDW 15.3 (H) 11.5 - 14.5 %    Platelets 377 150 - 450 x10*3/uL   Basic metabolic panel   Result Value Ref Range    Glucose 92 65 - 99 mg/dL    Sodium 141 133 - 145 mmol/L    Potassium 4.0 3.4 - 5.1 mmol/L    Chloride 106 97 - 107 mmol/L    Bicarbonate 24 24 - 31 mmol/L    Urea Nitrogen 19 8 - 25 mg/dL    Creatinine 0.80 0.40 - 1.60 mg/dL    eGFR 77 >60 mL/min/1.73m*2    Calcium 9.0 8.5 - 10.4 mg/dL    Anion Gap 11 <=19 mmol/L               Assessment/Plan      Principal Problem:    Fracture of right inferior pubic ramus, closed, initial encounter (Multi)  Active Problems:    Essential hypertension    Myeloproliferative disorder (Multi)    Reactive depression    Vitamin B 12 deficiency    Vitamin D deficiency    Ambulatory dysfunction      Pubic Rami Fracture  (Non-surgical fracture)  --  WBAT w/ PT/OT  --  pain control  --  Ortho input appreciated  --  ICE to groin PRN  --  pt. Open to SNF if necessary    Vit B deficiency  --  continue home med    Vit D Deficiency  --  continue home med    Myeloproliferative disorder  Continue home hydroxyurea     HTN  HLD  Continue home aspirin, atenolol, and lipitor     Insomnia  Home lunesta is not on formulary -> switch to ambien, continue nightly PRN       DVT ppx: lovenox  GI ppx: protonix     Plan:  Admit to obs RNF  Orthopedic surgery consulted- recommendations appreciated  Pain management  Bowel regimen  Continue home meds  PT/OT  Discharge planning              Guillermina Andrews, APRN-CNP

## 2024-06-20 NOTE — PROGRESS NOTES
Occupational Therapy    Evaluation/Treatment    Patient Name: Victoria Trotter  MRN: [id number]  : 1950  Today's Date: 24  Time Calculation  Start Time: 946  Stop Time:   Time Calculation (min): 24 min       Assessment:  End of Session Communication: Bedside nurse  End of Session Patient Position: Bed, 3 rail up, Alarm on  Strengths: Ability to acquire knowledge  Barriers to Participation: Comorbidities  Plan:  Treatment Interventions: ADL retraining, Functional transfer training, UE strengthening/ROM, Endurance training, Patient/family training, Equipment evaluation/education  OT Frequency: 5 times per week  OT Discharge Recommendations: Moderate intensity level of continued care  OT Recommended Transfer Status: Assist of 2  OT - OK to Discharge: Yes  Treatment Interventions: ADL retraining, Functional transfer training, UE strengthening/ROM, Endurance training, Patient/family training, Equipment evaluation/education    Subjective   Current Problem:  1. Fracture of right inferior pubic ramus, closed, initial encounter (Multi)        2. Closed fracture of ramus of right pubis, initial encounter (Multi)          General:   OT Received On: 24  General  Reason for Referral: OT Eval and treat  Referred By: Daylin Brizuela MD  Past Medical History Relevant to Rehab: myeloproliferative disorder, insomnia, Vit D deficiency, Vit B12 deficiency, HTN  Family/Caregiver Present: Yes  Caregiver Feedback: dtr at bedside; addressed all therapy related questions and concerns  Co-Treatment: PT  Co-Treatment Reason:  (optimize Pt outcomes)  Prior to Session Communication: Bedside nurse  Patient Position Received: Bed, 3 rail up, Alarm off, not on at start of session  Preferred Learning Style: verbal  General Comment: pt cleared for therapy via RN, agreeable to participate, received supine in bed; (+) purewick  Precautions:  Hearing/Visual Limitations: wears glasses  LE Weight Bearing Status: Weight Bearing as  Tolerated  Medical Precautions: Fall precautions    Pain:  Pain Assessment  Pain Assessment: 0-10  0-10 (Numeric) Pain Score: 5 - Moderate pain  Pain Type: Acute pain  Pain Location: Hip  Pain Orientation: Right  Pain Interventions: Emotional support, Repositioned    Objective   Cognition:  Orientation Level: Oriented X4      Judd Agitation Sedation Scale  Judd Agitation Sedation Scale (RASS): Alert and calm  Home Living:  Type of Home: House  Lives With: Spouse  Home Adaptive Equipment: Cane, Other (Comment)  Home Layout: One level  Home Access: Stairs to enter without rails  Entrance Stairs-Rails: None  Entrance Stairs-Number of Steps:  (1)  Bathroom Shower/Tub: Walk-in shower  Bathroom Toilet: Handicapped height  Bathroom Equipment: Shower chair with back  Prior Function:  Level of [place]: Independent with ADLs and functional transfers, Independent with homemaking with ambulation  ADL Assistance: Independent  Homemaking Assistance: Independent  Laundry: Independent  Ambulatory Assistance: Independent  Prior Function Comments: pt reports 1 fall in past 6 months; pt broke L femur 2 years ago with edgardo placement in bilateral LEs    ADL:  Eating Assistance: Independent  Grooming Assistance: Stand by  Bathing Assistance: Maximal  UE Dressing Assistance: Moderate  LE Dressing Assistance: Total  Toileting Assistance with Device: Total  Activities of Daily Living: LE Dressing  LE Dressing:  (total)     Activity Tolerance:  Endurance: Decreased tolerance for upright activites  Functional Standing Tolerance:  Time:  (~30-60 seconds with UE support on FWW)  Bed Mobility/Transfers: Bed Mobility  Bed Mobility:  (Max A x 2 for sup to sit assist with R LE, assist at trunk, very slow d/t pain)    Transfers  Transfer:  (sit to stand, Mod A x 2, side steps with FWW Mod A x 2)      Sitting Balance:  Static Sitting Balance  Static Sitting-Balance Support: Bilateral upper extremity supported  Standing Balance:  Static  Standing Balance  Static Standing-Level of Assistance: Minimum assistance    Strength:  Strength Comments:  (B UE WFL)    Coordination:  Movements are Fluid and Coordinated:  (no, slowed d/t pain)  Coordination Comment: decreased rate/accuracy of movement B LEs   Hand Function:  Hand Function  Gross Grasp: Functional  Coordination: Functional    Outcome Measures: [facility] Daily Activity  Putting on and taking off regular lower body clothing: Total  Bathing (including washing, rinsing, drying): A lot  Putting on and taking off regular upper body clothing: A lot  Toileting, which includes using toilet, bedpan or urinal: Total  Taking care of personal grooming such as brushing teeth: A little  Eating Meals: None  Daily Activity - Total Score: 13        Education Documentation  ADL Training, taught by Mildred Alcocer OT at 6/20/2024 12:26 PM.  Learner: Patient  Readiness: Acceptance  Method: Explanation  Response: Verbalizes Understanding    Education Comments  No comments found.        OP EDUCATION:       Goals:  Encounter Problems       Encounter Problems (Active)       ADL       Goal 1 (Progressing)       Start:  06/20/24    Expected End:  07/05/24       Patient will demonstrate improved ADL skills:  Bathing with Mod A and AE/DME PRN  Grooming with SBA assist standing at sink       UE Dressing with Min assist           LE Dressing with Mod A and AE/DME PRN     Toileting with Mod A and AE/DME PRN

## 2024-06-21 VITALS
SYSTOLIC BLOOD PRESSURE: 111 MMHG | BODY MASS INDEX: 25.2 KG/M2 | RESPIRATION RATE: 18 BRPM | HEIGHT: 59 IN | WEIGHT: 125 LBS | DIASTOLIC BLOOD PRESSURE: 71 MMHG | OXYGEN SATURATION: 94 % | HEART RATE: 77 BPM | TEMPERATURE: 98.6 F

## 2024-06-21 PROBLEM — E53.8 VITAMIN B 12 DEFICIENCY: Status: RESOLVED | Noted: 2023-08-31 | Resolved: 2024-06-21

## 2024-06-21 PROBLEM — I10 ESSENTIAL HYPERTENSION: Status: RESOLVED | Noted: 2023-08-31 | Resolved: 2024-06-21

## 2024-06-21 PROBLEM — D47.1 MYELOPROLIFERATIVE DISORDER (MULTI): Status: RESOLVED | Noted: 2023-08-31 | Resolved: 2024-06-21

## 2024-06-21 PROBLEM — F32.9 REACTIVE DEPRESSION: Status: RESOLVED | Noted: 2023-08-31 | Resolved: 2024-06-21

## 2024-06-21 PROBLEM — R26.2 AMBULATORY DYSFUNCTION: Status: RESOLVED | Noted: 2024-06-20 | Resolved: 2024-06-21

## 2024-06-21 PROBLEM — E55.9 VITAMIN D DEFICIENCY: Status: RESOLVED | Noted: 2023-08-31 | Resolved: 2024-06-21

## 2024-06-21 PROBLEM — S32.591A: Status: RESOLVED | Noted: 2024-06-19 | Resolved: 2024-06-21

## 2024-06-21 PROCEDURE — 97116 GAIT TRAINING THERAPY: CPT | Mod: GP,CQ

## 2024-06-21 PROCEDURE — G0378 HOSPITAL OBSERVATION PER HR: HCPCS

## 2024-06-21 PROCEDURE — 2500000005 HC RX 250 GENERAL PHARMACY W/O HCPCS: Performed by: STUDENT IN AN ORGANIZED HEALTH CARE EDUCATION/TRAINING PROGRAM

## 2024-06-21 PROCEDURE — 97110 THERAPEUTIC EXERCISES: CPT | Mod: GO

## 2024-06-21 PROCEDURE — 2500000004 HC RX 250 GENERAL PHARMACY W/ HCPCS (ALT 636 FOR OP/ED): Performed by: STUDENT IN AN ORGANIZED HEALTH CARE EDUCATION/TRAINING PROGRAM

## 2024-06-21 PROCEDURE — 2500000001 HC RX 250 WO HCPCS SELF ADMINISTERED DRUGS (ALT 637 FOR MEDICARE OP): Performed by: NURSE PRACTITIONER

## 2024-06-21 PROCEDURE — 97110 THERAPEUTIC EXERCISES: CPT | Mod: GP,CQ

## 2024-06-21 PROCEDURE — 2500000004 HC RX 250 GENERAL PHARMACY W/ HCPCS (ALT 636 FOR OP/ED): Performed by: NURSE PRACTITIONER

## 2024-06-21 PROCEDURE — 2500000001 HC RX 250 WO HCPCS SELF ADMINISTERED DRUGS (ALT 637 FOR MEDICARE OP): Performed by: STUDENT IN AN ORGANIZED HEALTH CARE EDUCATION/TRAINING PROGRAM

## 2024-06-21 PROCEDURE — 97530 THERAPEUTIC ACTIVITIES: CPT | Mod: GO

## 2024-06-21 PROCEDURE — 96372 THER/PROPH/DIAG INJ SC/IM: CPT | Performed by: STUDENT IN AN ORGANIZED HEALTH CARE EDUCATION/TRAINING PROGRAM

## 2024-06-21 PROCEDURE — 96374 THER/PROPH/DIAG INJ IV PUSH: CPT | Performed by: STUDENT IN AN ORGANIZED HEALTH CARE EDUCATION/TRAINING PROGRAM

## 2024-06-21 RX ORDER — OXYCODONE HYDROCHLORIDE 5 MG/1
5 TABLET ORAL EVERY 6 HOURS PRN
Qty: 15 TABLET | Refills: 0 | Status: SHIPPED | OUTPATIENT
Start: 2024-06-21 | End: 2024-06-24

## 2024-06-21 RX ORDER — LIDOCAINE 560 MG/1
1 PATCH PERCUTANEOUS; TOPICAL; TRANSDERMAL DAILY
Start: 2024-06-21

## 2024-06-21 RX ORDER — POLYETHYLENE GLYCOL 3350 17 G/17G
17 POWDER, FOR SOLUTION ORAL DAILY
Start: 2024-06-22

## 2024-06-21 RX ORDER — BISACODYL 10 MG/1
10 SUPPOSITORY RECTAL NIGHTLY PRN
Status: DISCONTINUED | OUTPATIENT
Start: 2024-06-22 | End: 2024-06-21 | Stop reason: HOSPADM

## 2024-06-21 RX ORDER — DOCUSATE SODIUM 100 MG/1
100 CAPSULE, LIQUID FILLED ORAL 2 TIMES DAILY
Status: DISCONTINUED | OUTPATIENT
Start: 2024-06-21 | End: 2024-06-21 | Stop reason: HOSPADM

## 2024-06-21 RX ORDER — ONDANSETRON HYDROCHLORIDE 2 MG/ML
4 INJECTION, SOLUTION INTRAVENOUS EVERY 8 HOURS PRN
Status: DISCONTINUED | OUTPATIENT
Start: 2024-06-21 | End: 2024-06-21 | Stop reason: HOSPADM

## 2024-06-21 RX ORDER — BISACODYL 5 MG
10 TABLET, DELAYED RELEASE (ENTERIC COATED) ORAL DAILY PRN
Status: DISCONTINUED | OUTPATIENT
Start: 2024-06-22 | End: 2024-06-21 | Stop reason: HOSPADM

## 2024-06-21 RX ORDER — BISACODYL 5 MG
10 TABLET, DELAYED RELEASE (ENTERIC COATED) ORAL ONCE
Status: COMPLETED | OUTPATIENT
Start: 2024-06-21 | End: 2024-06-21

## 2024-06-21 RX ORDER — DOCUSATE SODIUM 100 MG/1
100 CAPSULE, LIQUID FILLED ORAL 2 TIMES DAILY
Start: 2024-06-21

## 2024-06-21 RX ORDER — ONDANSETRON 4 MG/1
4 TABLET, FILM COATED ORAL EVERY 8 HOURS PRN
Status: DISCONTINUED | OUTPATIENT
Start: 2024-06-21 | End: 2024-06-21 | Stop reason: HOSPADM

## 2024-06-21 ASSESSMENT — COGNITIVE AND FUNCTIONAL STATUS - GENERAL
DRESSING REGULAR LOWER BODY CLOTHING: A LITTLE
STANDING UP FROM CHAIR USING ARMS: A LOT
MOBILITY SCORE: 11
DRESSING REGULAR UPPER BODY CLOTHING: A LOT
DRESSING REGULAR UPPER BODY CLOTHING: A LITTLE
PERSONAL GROOMING: A LITTLE
TOILETING: A LITTLE
MOBILITY SCORE: 17
CLIMB 3 TO 5 STEPS WITH RAILING: TOTAL
TURNING FROM BACK TO SIDE WHILE IN FLAT BAD: A LOT
HELP NEEDED FOR BATHING: A LOT
TURNING FROM BACK TO SIDE WHILE IN FLAT BAD: A LITTLE
MOVING TO AND FROM BED TO CHAIR: A LOT
MOVING FROM LYING ON BACK TO SITTING ON SIDE OF FLAT BED WITH BEDRAILS: A LITTLE
WALKING IN HOSPITAL ROOM: A LOT
WALKING IN HOSPITAL ROOM: A LITTLE
MOVING TO AND FROM BED TO CHAIR: A LITTLE
CLIMB 3 TO 5 STEPS WITH RAILING: A LOT
DAILY ACTIVITIY SCORE: 19
HELP NEEDED FOR BATHING: A LITTLE
DAILY ACTIVITIY SCORE: 13
DRESSING REGULAR LOWER BODY CLOTHING: TOTAL
PERSONAL GROOMING: A LITTLE
STANDING UP FROM CHAIR USING ARMS: A LITTLE
TOILETING: TOTAL
MOVING FROM LYING ON BACK TO SITTING ON SIDE OF FLAT BED WITH BEDRAILS: A LOT

## 2024-06-21 ASSESSMENT — PAIN DESCRIPTION - ORIENTATION
ORIENTATION: RIGHT
ORIENTATION: RIGHT

## 2024-06-21 ASSESSMENT — PAIN DESCRIPTION - LOCATION
LOCATION: PELVIS
LOCATION: HIP

## 2024-06-21 ASSESSMENT — PAIN SCALES - GENERAL
PAINLEVEL_OUTOF10: 7
PAINLEVEL_OUTOF10: 0 - NO PAIN
PAINLEVEL_OUTOF10: 7
PAINLEVEL_OUTOF10: 0 - NO PAIN
PAINLEVEL_OUTOF10: 7

## 2024-06-21 ASSESSMENT — PAIN - FUNCTIONAL ASSESSMENT
PAIN_FUNCTIONAL_ASSESSMENT: 0-10

## 2024-06-21 NOTE — PROGRESS NOTES
"Victoria Trotter is a 74 y.o. female on day 1 of admission presenting with Fracture of right inferior pubic ramus, closed, initial encounter (Multi).    Subjective   HPI   Patient seen and examined, complains with  constipation, nausea and  vomiting but no abdominal pain. She said she did not have a BM for 3 days , her appetite is fine   Patient denies any chest pain, shortness of breath in room air. no No fever or chills.  No headache or dizziness.      Objective     Last Recorded Vitals  Blood pressure 111/71, pulse 77, temperature 37 °C (98.6 °F), temperature source Oral, resp. rate 18, height 1.499 m (4' 11\"), weight 56.7 kg (125 lb), SpO2 94%.      Intake/Output Summary (Last 24 hours) at 6/21/2024 0936  Last data filed at 6/21/2024 0500  Gross per 24 hour   Intake 1000 ml   Output 950 ml   Net 50 ml         Physical Exam   General: alert, no diaphoresis   HENT: mucous membranes moist, external ears normal, no rhinorrhea   Eyes: no icterus or injection, no discharge   Lungs: CTA BL   Heart: RRR, no murmurs, no LE edema BL   GI: abdomen soft, nontender, nondistended, BS present   MSK: no joint effusion or deformity   Skin: no rashes, erythema, or ecchymosis   Neuro: grossly normal cognition, motor strength, sensation   Relevant Results    Lab Results   Component Value Date    WBC 5.6 06/20/2024    HGB 11.1 (L) 06/20/2024    HCT 34.6 (L) 06/20/2024     (H) 06/20/2024     06/20/2024       Lab Results   Component Value Date    GLUCOSE 92 06/20/2024    CALCIUM 9.0 06/20/2024     06/20/2024    K 4.0 06/20/2024    CO2 24 06/20/2024     06/20/2024    BUN 19 06/20/2024    CREATININE 0.80 06/20/2024       No results found for: \"HGBA1C\"    CT cervical spine wo IV contrast    Result Date: 6/19/2024  Interpreted By:  Jitendra Zambrano, STUDY: CT CERVICAL SPINE WO IV CONTRAST; 6/19/2024 1:39 pm   INDICATION: Signs/Symptoms:fall;   COMPARISON: None   ACCESSION NUMBER(S): [id number]   ORDERING " CLINICIAN: VIRGIL THOMPSON   TECHNIQUE: Contiguous axial images were acquired from the skull base to the lung apices. Coronal and sagittal reformatted images were obtained. All CT examinations are performed with 1 or more of the following dose reduction techniques: Automated exposure control, adjustment of mA and/or kv according to patient's size, or use of iterative reconstruction techniques.   FINDINGS: There is straightening and slight reversal of the normal cervical lordosis.  No acute fracture or traumatic malalignment is identified. Facet degenerative changes and uncovertebral joint degenerative changes are present. Trace degenerative anterolisthesis of C3 on C4 and C7 on T1. Otherwise adequate alignment     The occipital condyles, arch of C1, and the odontoid processes are intact. The atlantoaxial relationship is well maintained.   No evidence for bony spinal canal stenosis. There is moderate-severe left neural foramina stenosis at C3-4. Moderate-severe left neural foramina stenosis at C4-5 severe right neural foramina stenosis at C5-6. Moderate-severe left neural foramina stenosis at C5-6. Moderate-severe left neural foramina stenosis at C6-7.   The visualized lung apices are unremarkable.       1. No acute fracture or traumatic malalignment. 2. Degenerative disc disease and spondylosis as described in the body of the report.     Signed by: Jitendra Zambrano 6/19/2024 2:17 PM Dictation workstation:   [id number]    CT head wo IV contrast    Result Date: 6/19/2024  Interpreted By:  Jitendra Zambrano, STUDY: VIRGIL THOMPSON; 6/19/2024 1:39 pm   INDICATION: Signs/Symptoms:fall;   COMPARISON: None   ACCESSION NUMBER(S): [id number]   ORDERING CLINICIAN: VIRGIL THOMPSON   TECHNIQUE: Contiguous axial images were acquired from the vertex through the posterior fossa without IV contrast. All CT examinations are performed with 1 or more of the following dose reduction techniques: Automated exposure control,  adjustment of mA and/or kv according to patient's size, or use of iterative reconstruction techniques.   FINDINGS: No focal mass effect or midline shift is identified. The ventricles and sulci are symmetric and appropriate for the patient's age.   The gray white matter differentiation is preserved.   No acute intracranial hemorrhage is seen. No intra-axial or extra-axial fluid collection is seen.   The visualized paranasal sinuses show air-fluid levels in the bilateral maxillary sinuses and right sphenoid sinus suggesting acute sinusitis. No evidence for chronic sinusitis. The paranasal sinuses are otherwise clear without mucoperiosteal thickening. The mastoid air cells and middle ear cavities are clear bilaterally.       No CT evidence for acute intracranial pathology.   Air-fluid levels within the maxillary sinuses and right sphenoid sinus suggesting acute sinusitis. There is no significant mucoperiosteal thickening nor chronic sinusitis.   Signed by: Jitendra Zambrano 6/19/2024 2:03 PM Dictation workstation:   [id number]    XR hips bilateral 5+ VW w pelvis when performed    Result Date: 6/19/2024  Interpreted By:  Jitendra Zambrano, STUDY: XR HIPS BILATERAL 5+ VW WITH PELVIS WHEN PERFORMED; 6/19/2024 1:29 pm   INDICATION: Signs/Symptoms:pain.   COMPARISON: None available.   ACCESSION NUMBER(S): [id number]   ORDERING CLINICIAN: VIRGIL THOMPSON   TECHNIQUE: AP view of the pelvis. Additionally, AP and Lateral views of both the left and the right hips.   FINDINGS: Prior ORIF of the proximal femurs bilaterally.   Acute fractures of the right superior ramus and right inferior ramus. The remainder of the pelvic bones and SI joints are intact.   No other bony or soft tissue abnormality is identified. Prominent degenerative change of the visualized lower lumbar spine.       Acute fractures of the right superior ramus and right inferior ramus.   Hip joints are intact. No additional osseous abnormalities are identified.    Signed by: Jitendra Zambrano 6/19/2024 1:54 PM Dictation workstation:   [id number]    ECG 12 lead    Result Date: 6/19/2024  Normal sinus rhythm Normal ECG No previous ECGs available    Scheduled medications  acetaminophen, 975 mg, oral, q8h  ascorbic acid, 500 mg, oral, Daily  aspirin, 81 mg, oral, Daily  atenolol, 25 mg, oral, Daily  atorvastatin, 20 mg, oral, Nightly  bisacodyl, 10 mg, oral, Once  bisacodyl, 10 mg, oral, Once  bisacodyl, 10 mg, rectal, Daily  cholecalciferol, 1,000 Units, oral, Daily  cyanocobalamin, 1,000 mcg, oral, Daily  docusate sodium, 100 mg, oral, BID  enoxaparin, 40 mg, subcutaneous, Daily  escitalopram, 20 mg, oral, Daily  gabapentin, 100 mg, oral, BID  hydroxyurea, 500 mg, oral, Daily  lidocaine, 1 patch, transdermal, Daily  pantoprazole, 40 mg, oral, Daily before breakfast   Or  pantoprazole, 40 mg, intravenous, Daily before breakfast  polyethylene glycol, 17 g, oral, Daily      Continuous medications     PRN medications  PRN medications: benzocaine-menthol, dextromethorphan-guaifenesin, fluticasone, ondansetron **OR** ondansetron, oxyCODONE **OR** oxyCODONE, zolpidem    Assessment/Plan   Principal Problem:    Fracture of right inferior pubic ramus, closed, initial encounter (Multi)  Active Problems:    Essential hypertension    Myeloproliferative disorder (Multi)    Reactive depression    Vitamin B 12 deficiency    Vitamin D deficiency    Ambulatory dysfunction       Pubic Rami Fracture  (Non-surgical fracture)   WBAT w/ PT/OT  Keep pain under control  Ortho input appreciated  ICE to groin PRN  discharge to  SNF     Vit B deficiency   continue home med     Vit D Deficiency  continue home med     Myeloproliferative disorder  Continue home hydroxyurea     HTN  BP is under control   Continue with the same regiment     HLD  Continue home aspirin, atenolol, and lipitor     Insomnia  Home lunesta is not on formulary -> switch to ambien, continue nightly PRN        DVT ppx: lovenox  GI  ppx: protonix     Plan:  Orthopedic surgery consulted- recommendations appreciated  Pain management  Continue home meds         Discharge plan:  Anticipate discharging patient to skilled of nursing today     I spent 35 minutes in the professional and overall care of this patient.    COCO Guzmán-CNP

## 2024-06-21 NOTE — PROGRESS NOTES
Occupational Therapy    OT Treatment    Patient Name: Victoria Trotter  MRN: [id number]  Today's Date: 6/21/2024  Time Calculation  Start Time: 0830  Stop Time: 0855  Time Calculation (min): 25 min         Assessment:  OT Assessment: Pt is demonstrating minimal functional progress towards her established goals with bed mobility and functional transfers/mobility. Continue POC.  Evaluation/Treatment Tolerance: Patient limited by pain  End of Session Communication: Bedside nurse  End of Session Patient Position: Up in chair, Alarm off, not on at start of session (Needs in reach and pt is cognitively aware of needing assist to get out of the chair and use the call light for help.)    Plan:  Treatment Interventions: ADL retraining, Functional transfer training, UE strengthening/ROM, Endurance training, Patient/family training, Equipment evaluation/education  OT Frequency: 5 times per week  OT Discharge Recommendations: Moderate intensity level of continued care  Equipment Recommended upon Discharge: Wheeled walker  OT Recommended Transfer Status: Moderate assist, Assist of 2  OT - OK to Discharge: Yes      Subjective     Previous Visit Info:  OT Last Visit  OT Received On: 06/21/24    General:  General  Family/Caregiver Present: No  Co-Treatment: PT  Co-Treatment Reason: Partial co-treat d/t physical complexity during bed mobility and functional transfers/mobility requiring 2 skilled Therapists  Prior to Session Communication: Bedside nurse  Patient Position Received: Bed, 3 rail up, Alarm off, not on at start of session  General Comment: Cleared by nurse prior to session and pt agreeable to OT treatment.    Precautions:  Hearing/Visual Limitations: wears glasses  LE Weight Bearing Status: Weight Bearing as Tolerated  Medical Precautions: Fall precautions    Pain:  Pain Assessment  Pain Assessment: FLACC (Face, Legs, Activity, Cry, Consolability)  0-10 (Numeric) Pain Score: 7  Pain Type: Acute pain  Pain Location:  Pelvis  Pain Orientation: Right  Pain Interventions: Other (Comment) (Pt premedicated for pain by the nurse before the session.)    Objective      Bed Mobility/Transfers: Bed Mobility  Bed Mobility: Yes  Bed Mobility 1  Bed Mobility 1: Supine to sitting  Level of Assistance 1: Moderate assistance, Minimal verbal cues  Bed Mobility Comments 1: Pt completed with mod A x1-2 for trunk up and BLE's off the bed with increased time to complete at a slow pace.    Transfers  Transfer: Yes  Transfer 1  Transfer From 1: Bed to  Transfer to 1: Stand  Technique 1: Sit to stand  Transfer Device 1: Walker  Transfer Level of Assistance 1: Moderate assistance, +2, Minimal verbal cues  Trials/Comments 1: Increased tactile assist for sit to stand for balance and due to increased pain.  Transfers 2  Transfer From 2: Stand to  Transfer to 2: Chair with arms  Technique 2: Stand to sit  Transfer Device 2: Walker  Transfer Level of Assistance 2: Moderate assistance, +2, Minimal verbal cues  Trials/Comments 2: Increased tactile assist for stand to sit for balance and due to increased pain.    Functional Mobility:  Functional Mobility  Functional Mobility Performed: Yes  Functional Mobility 1  Device 1: Rolling walker  Assistance 1: Moderate assistance, Minimal verbal cues (x2 assist)  Comments 1: Pt tolerated taking a few steps using the RW with mod A x2 for balance/safety and verbal cues for walker sequencing.    Therapy/Activity: Therapeutic Exercise  Therapeutic Exercise Performed: Yes (Pt participated in BUE strengthening exercises while seated in the chair using a yellow resistance band in order to enhance functional transfers.)  Therapeutic Exercise Activity 1: Shoulder flexion for 10 reps x2  Therapeutic Exercise Activity 2: Bicep curls for 10 reps x2  Therapeutic Exercise Activity 3: Tricep curls for 10 reps x2      Outcome Measures:[facility] Daily Activity  Putting on and taking off regular lower body clothing: Total  Bathing  (including washing, rinsing, drying): A lot  Putting on and taking off regular upper body clothing: A lot  Toileting, which includes using toilet, bedpan or urinal: Total  Taking care of personal grooming such as brushing teeth: A little  Eating Meals: None  Daily Activity - Total Score: 13      Education Documentation  No documentation found.  Education Comments  No comments found.      Goals:  Encounter Problems       Encounter Problems (Active)       ADL       Goal 1 (Progressing)       Start:  06/20/24    Expected End:  07/05/24       Patient will demonstrate improved ADL skills:  Bathing with Mod A and AE/DME PRN  Grooming with SBA assist standing at sink       UE Dressing with Min assist           LE Dressing with Mod A and AE/DME PRN     Toileting with Mod A and AE/DME PRN

## 2024-06-21 NOTE — DISCHARGE SUMMARY
Discharge Diagnosis  Fracture of right inferior pubic ramus, closed, initial encounter (Multi)  Problem   Ambulatory Dysfunction (Resolved)   Fracture of Right Inferior Pubic Ramus, Closed, Initial Encounter (Multi) (Resolved)   Essential Hypertension (Resolved)   Myeloproliferative Disorder (Multi) (Resolved)   Reactive Depression (Resolved)   Vitamin B 12 Deficiency (Resolved)   Vitamin D Deficiency (Resolved)         Issues Requiring Follow-Up  Follow up with your PCP Mimi Marlow, [facility], within 1 week     Imaging results   CT cervical spine wo IV contrast    Result Date: 6/19/2024  Interpreted By:  Jitendra Zambrano, STUDY: CT CERVICAL SPINE WO IV CONTRAST; 6/19/2024 1:39 pm   INDICATION: Signs/Symptoms:fall;   COMPARISON: None   ACCESSION NUMBER(S): [id number]   ORDERING CLINICIAN: VIRGIL THOMPSON   TECHNIQUE: Contiguous axial images were acquired from the skull base to the lung apices. Coronal and sagittal reformatted images were obtained. All CT examinations are performed with 1 or more of the following dose reduction techniques: Automated exposure control, adjustment of mA and/or kv according to patient's size, or use of iterative reconstruction techniques.   FINDINGS: There is straightening and slight reversal of the normal cervical lordosis.  No acute fracture or traumatic malalignment is identified. Facet degenerative changes and uncovertebral joint degenerative changes are present. Trace degenerative anterolisthesis of C3 on C4 and C7 on T1. Otherwise adequate alignment     The occipital condyles, arch of C1, and the odontoid processes are intact. The atlantoaxial relationship is well maintained.   No evidence for bony spinal canal stenosis. There is moderate-severe left neural foramina stenosis at C3-4. Moderate-severe left neural foramina stenosis at C4-5 severe right neural foramina stenosis at C5-6. Moderate-severe left neural foramina stenosis at C5-6. Moderate-severe left neural  foramina stenosis at C6-7.   The visualized lung apices are unremarkable.       1. No acute fracture or traumatic malalignment. 2. Degenerative disc disease and spondylosis as described in the body of the report.     Signed by: Jitendra Zambrano 6/19/2024 2:17 PM Dictation workstation:   [id number]    CT head wo IV contrast    Result Date: 6/19/2024  Interpreted By:  Jitendra Zambrano, STUDY: VIRGIL THOMPSON; 6/19/2024 1:39 pm   INDICATION: Signs/Symptoms:fall;   COMPARISON: None   ACCESSION NUMBER(S): [id number]   ORDERING CLINICIAN: VIRGIL THOMPSON   TECHNIQUE: Contiguous axial images were acquired from the vertex through the posterior fossa without IV contrast. All CT examinations are performed with 1 or more of the following dose reduction techniques: Automated exposure control, adjustment of mA and/or kv according to patient's size, or use of iterative reconstruction techniques.   FINDINGS: No focal mass effect or midline shift is identified. The ventricles and sulci are symmetric and appropriate for the patient's age.   The gray white matter differentiation is preserved.   No acute intracranial hemorrhage is seen. No intra-axial or extra-axial fluid collection is seen.   The visualized paranasal sinuses show air-fluid levels in the bilateral maxillary sinuses and right sphenoid sinus suggesting acute sinusitis. No evidence for chronic sinusitis. The paranasal sinuses are otherwise clear without mucoperiosteal thickening. The mastoid air cells and middle ear cavities are clear bilaterally.       No CT evidence for acute intracranial pathology.   Air-fluid levels within the maxillary sinuses and right sphenoid sinus suggesting acute sinusitis. There is no significant mucoperiosteal thickening nor chronic sinusitis.   Signed by: Jitendra Zambrano 6/19/2024 2:03 PM Dictation workstation:   [id number]    XR hips bilateral 5+ VW w pelvis when performed    Result Date: 6/19/2024  Interpreted By:  Juan Manuel  Jitendra, STUDY: XR HIPS BILATERAL 5+ VW WITH PELVIS WHEN PERFORMED; 6/19/2024 1:29 pm   INDICATION: Signs/Symptoms:pain.   COMPARISON: None available.   ACCESSION NUMBER(S): [id number]   ORDERING CLINICIAN: VIRGIL THOMPSON   TECHNIQUE: AP view of the pelvis. Additionally, AP and Lateral views of both the left and the right hips.   FINDINGS: Prior ORIF of the proximal femurs bilaterally.   Acute fractures of the right superior ramus and right inferior ramus. The remainder of the pelvic bones and SI joints are intact.   No other bony or soft tissue abnormality is identified. Prominent degenerative change of the visualized lower lumbar spine.       Acute fractures of the right superior ramus and right inferior ramus.   Hip joints are intact. No additional osseous abnormalities are identified.   Signed by: Jitendra Zambrano 6/19/2024 1:54 PM Dictation workstation:   [id number]    ECG 12 lead    Result Date: 6/19/2024  Normal sinus rhythm Normal ECG No previous ECGs available      Hospital Course  From [facility] on admission:  Victoria Trotter is a 74 y.o. female hx of myeloproliferative disorder, insomnia, Vit D deficiency, Vit B12 deficiency, HTN who presented with right hip pain after having a mechanical fall. Patient states she was at the park running after her grandson when she tripped and fell. States that she landed on there right hip but did hit the back of her head as well. Denies loss of consciousness. States she takes a baby aspirin daily but otherwise does not take any blood thinners. Patient was unable to get up after falling and EMS was called. Patient denies dizziness or lightheadedness preceding the fall.      In the ED, VSS. Labs showing unremarkable other than . XR hip showing acute fractures of the right superior ramus and right inferior ramus, hip joints are intact. CT head negative for acute intracranial pathology. CT c-spine negative for acute fracture or traumatic malalignment. Given tylenol  "975mg x1.     Brief Hospital course:  Admitted with acute right superior and inferior pubic Rami fractures SP to fall.  Evaluated by Ortho surgeon Dr. Gomez recommends weightbearing as tolerated, and patient has to follow-up with him in 6 weeks.  Patient having difficulty walking will go to rehab.  Patient will be discharged with oxycodone as needed for acute pain on her pelvic area.  Patient is hemodynamically stable to be discharged to SNF.     Physical exam  Physical Exam  General: alert, no diaphoresis   HENT: mucous membranes moist, external ears normal, no rhinorrhea   Eyes: no icterus or injection, no discharge   Lungs: CTA BL   Heart: RRR, no murmurs, no LE edema BL   GI: abdomen soft, nontender, nondistended, BS present   MSK: no joint effusion or deformity   Skin: no rashes, erythema, or ecchymosis   Neuro: grossly normal cognition, motor strength, sensation     Relevant Results    Lab Results   Component Value Date    WBC 5.6 06/20/2024    HGB 11.1 (L) 06/20/2024    HCT 34.6 (L) 06/20/2024     (H) 06/20/2024     06/20/2024     Lab Results   Component Value Date    GLUCOSE 92 06/20/2024    CALCIUM 9.0 06/20/2024     06/20/2024    K 4.0 06/20/2024    CO2 24 06/20/2024     06/20/2024    BUN 19 06/20/2024    CREATININE 0.80 06/20/2024     No results found for: \"INR\", \"PROTIME\"         Medication List      START taking these medications     docusate sodium 100 mg capsule; Commonly known as: Colace; Take 1   capsule (100 mg) by mouth 2 times a day.   lidocaine 4 % patch; Place 1 patch over 12 hours on the skin once daily.   Remove & discard patch within 12 hours or as directed by MD.   oxyCODONE 5 mg immediate release tablet; Commonly known as: Roxicodone;   Take 1 tablet (5 mg) by mouth every 6 hours if needed for severe pain (7 -   10) for up to 3 days.   polyethylene glycol 17 gram packet; Commonly known as: Glycolax,   Miralax; Take 17 g by mouth once daily.; Start taking on: June " 22, 2024     CONTINUE taking these medications     atorvastatin 20 mg tablet; Commonly known as: Lipitor; Take 1 tablet (20   mg) by mouth once daily.   eszopiclone 2 mg tablet; Commonly known as: Lunesta; Take 1 tablet (2   mg) by mouth once daily at bedtime. As needed   fluticasone 50 mcg/actuation nasal spray; Commonly known as: Flonase   hydroxyurea 500 mg capsule; Commonly known as: Hydrea; Take 1 capsule   (500 mg total) by mouth once daily.   Lexapro 20 mg tablet; Generic drug: escitalopram   Lo-Dose Aspirin 81 mg EC tablet; Generic drug: aspirin   Vitamin B-12 1,000 mcg tablet; Generic drug: cyanocobalamin   Vitamin C 500 mg tablet; Generic drug: ascorbic acid   Vitamin D3 25 MCG (1000 UT) capsule; Generic drug: cholecalciferol     STOP taking these medications     atenolol 25 mg tablet; Commonly known as: Tenormin   gabapentin 100 mg capsule; Commonly known as: Neurontin   guaiFENesin 600 mg 12 hr tablet; Commonly known as: Mucinex   oxymetazoline 0.05 % nasal spray; Commonly known as: Afrin   pseudoephedrine  mg 12 hr tablet; Commonly known as: Sudafed-12   Hour       Outpatient Follow-Up  Future Appointments   Date Time Provider Department Center   7/24/2024  2:30 PM Jan Perry MD [id number] [facility]   11/4/2024  8:30 AM PERLA Cam[place]illo [id number] [facility]   12/18/2024  9:00 AM PERLA Torres [id number] [facility]         Time overall spent on discharge summary 55 minutes    PERLA Guzmán

## 2024-06-21 NOTE — CARE PLAN
The patient's goals for the shift include Pain management and comfort    The clinical goals for the shift include rest    Over the shift, the patient did   Problem: Fall/Injury  Goal: Not fall by end of shift  Outcome: Progressing     Problem: Fall/Injury  Goal: Be free from injury by end of the shift  Outcome: Progressing     Problem: Fall/Injury  Goal: Use assistive devices by end of the shift  Outcome: Progressing   make progress toward the following goals.

## 2024-06-21 NOTE — NURSING NOTE
Patient being discharged to [facility] scheduled for 1630. Report called and given to nurse over at [facility].Patient will be discharged with prescriptions, which have been placed in pt's discharge packet that will be sent over with patient. Nurse informed at thi time.All questions answered at this time.

## 2024-06-21 NOTE — NURSING NOTE
Called [facility] to get update on estimated time of arrival for patient since I was originally told by care coordinator that pickup was scheduled for 1630. When I spoke to [facility] I was informed that they did not have patient down for pickup. I was able to schedule  for patient. They informed me that they should be here within the hour. I informed PM shift nurse.

## 2024-06-21 NOTE — PROGRESS NOTES
Physical Therapy    Physical Therapy Treatment    Patient Name: Victoria Trotter  MRN: [id number]  Today's Date: 6/21/2024  Time Calculation  Start Time: 0827  Stop Time: 0851  Time Calculation (min): 24 min    Assessment/Plan   PT Assessment  End of Session Communication: Bedside nurse  End of Session Patient Position: Up in chair, Alarm off, not on at start of session  PT Plan  Inpatient/Swing Bed or Outpatient: Inpatient  PT Plan  Treatment/Interventions: Bed mobility, Transfer training, Gait training, Stair training, Balance training, Neuromuscular re-education, Strengthening, Endurance training, Range of motion, Therapeutic exercise, Therapeutic activity, Home exercise program, Positioning  PT Plan: Ongoing PT  PT Frequency: Daily  PT Discharge Recommendations: Moderate intensity level of continued care  Equipment Recommended upon Discharge: Wheeled walker  PT Recommended Transfer Status: Assist x2  PT - OK to Discharge: Yes (to next appropriate level of skilled care)      General Visit Information:   PT  Visit  PT Received On: 06/21/24  General  Co-Treatment: OT  Co-Treatment Reason: Partial co-treat d/t physical complexity during bed mobility and functional transfers/mobility requiring 2 skilled Therapists  Prior to Session Communication: Bedside nurse  Patient Position Received: Bed, 3 rail up, Alarm off, not on at start of session  General Comment: Cleared by nursing to be seen for therapy, pt agreeable with tx, supine in bed upon arrival.    Subjective   Precautions:  Precautions  LE Weight Bearing Status: Weight Bearing as Tolerated    Objective   Pain:  Pain Assessment  Pain Assessment: 0-10  0-10 (Numeric) Pain Score: 7  Pain Type: Acute pain  Pain Location: Pelvis  Pain Orientation: Right  Pain Interventions: Repositioned (Rn in to give medication prior to arrival.)     Postural Control:  Static Sitting Balance  Static Sitting-Balance Support: Bilateral upper extremity supported, Feet supported  Static  Sitting-Level of Assistance: Close supervision  Static Sitting-Comment/Number of Minutes: Fair seated static balance.  Static Standing Balance  Static Standing-Balance Support: Bilateral upper extremity supported  Static Standing-Level of Assistance: Moderate assistance  Static Standing-Comment/Number of Minutes: Fair - seated static balance.     Treatments:  Therapeutic Exercise  Therapeutic Exercise Performed: Yes  Therapeutic Exercise Activity 1: Bilateral ankle pumps x15  Therapeutic Exercise Activity 2: Bilateral hip flexion x15  Therapeutic Exercise Activity 3: Bilateral knee extension x15    Bed Mobility  Bed Mobility: Yes  Bed Mobility 1  Bed Mobility 1: Supine to sitting  Level of Assistance 1: Moderate assistance  Bed Mobility Comments 1: Mod assist for RLE during supine to sit, good use of UEs on bedrail to assist trunk up.    Ambulation/Gait Training  Ambulation/Gait Training Performed: Yes  Ambulation/Gait Training 1  Surface 1: Level tile  Device 1: Rolling walker  Assistance 1: Moderate assistance  Comments/Distance (ft) 1: 3 forward steps with wheeled walker, poor ability to advance LE's, heavy use of UE's on walker, poor ability to weight bear through RLE, mod assist for balance.  Transfers  Transfer: Yes  Transfer 1  Transfer From 1: Sit to  Transfer to 1: Stand  Technique 1: Sit to stand, Stand to sit  Transfer Level of Assistance 1: Moderate assistance  Trials/Comments 1: Mod assist for trunk up during sit to stand, cues for proper hand placement, decreased eccentric control in sitting.    Outcome Measures:  [facility] Basic Mobility  Turning from your back to your side while in a flat bed without using bedrails: A lot  Moving from lying on your back to sitting on the side of a flat bed without using bedrails: A lot  Moving to and from bed to chair (including a wheelchair): A lot  Standing up from a chair using your arms (e.g. wheelchair or bedside chair): A lot  To walk in hospital room: A  lot  Climbing 3-5 steps with railing: Total  Basic Mobility - Total Score: 11      Encounter Problems       Encounter Problems (Active)       Mobility       LTG - Patient will demonstrate ability to ambulate community distance with LRAD  (Progressing)       Start:  06/20/24    Expected End:  07/04/24            STG - Patient will ambulate 250' with use of rolling walker and CGA  (Not Progressing)       Start:  06/20/24    Expected End:  07/04/24            STG - Patient will ascend/descend a curb with use of rolling walker and CGA  (Not Progressing)       Start:  06/20/24    Expected End:  07/04/24               PT Transfers       LTG - Patient will demonstrate safe transfer techniques (Progressing)       Start:  06/20/24    Expected End:  07/04/24            STG - Patient will transfer sit <> stand with close supervision and use of rolling walker upon standing  (Progressing)       Start:  06/20/24    Expected End:  07/04/24

## 2024-06-21 NOTE — PROGRESS NOTES
06/21/24 0953   Discharge Planning   Living Arrangements Spouse/significant other   Support Systems Spouse/significant other   Assistance Needed rehab for therapy   Type of Residence Private residence   Home or Post Acute Services Post acute facilities (Rehab/SNF/etc)   Type of Post Acute Facility Services Skilled nursing   Patient expects to be discharged to: SNF   Does the patient need discharge transport arranged? Yes   RoundTrip coordination needed? Yes   Has discharge transport been arranged? No     Met with patient at bedside, daughter present, to discuss discharge planning, discussed first choice [facility] does not have any bed availability, patient/daughter chose [facility] second choice, 3rd choice [facility]. Referral sent,Awaiting acceptance. Patient medically ready for discharge.     [facility] accepted. approved. 6/21-6/25 NRD 6/25 auth#[id number]. 4:30p transport today. Patient/family aware.

## 2024-06-23 LAB
ATRIAL RATE: 70 BPM
P AXIS: 36 DEGREES
P OFFSET: 194 MS
P ONSET: 149 MS
PR INTERVAL: 154 MS
Q ONSET: 226 MS
QRS COUNT: 12 BEATS
QRS DURATION: 86 MS
QT INTERVAL: 386 MS
QTC CALCULATION(BAZETT): 416 MS
QTC FREDERICIA: 406 MS
R AXIS: 14 DEGREES
T AXIS: 9 DEGREES
T OFFSET: 419 MS
VENTRICULAR RATE: 70 BPM

## 2024-07-07 ENCOUNTER — HOME HEALTH ADMISSION (OUTPATIENT)
Dept: HOME HEALTH SERVICES | Facility: HOME HEALTH | Age: 74
End: 2024-07-07
Payer: MEDICARE

## 2024-07-10 ENCOUNTER — HOME CARE VISIT (OUTPATIENT)
Dept: HOME HEALTH SERVICES | Facility: HOME HEALTH | Age: 74
End: 2024-07-10
Payer: MEDICARE

## 2024-07-10 VITALS — SYSTOLIC BLOOD PRESSURE: 130 MMHG | DIASTOLIC BLOOD PRESSURE: 78 MMHG | TEMPERATURE: 98.2 F | HEART RATE: 70 BPM

## 2024-07-10 PROCEDURE — G0151 HHCP-SERV OF PT,EA 15 MIN: HCPCS

## 2024-07-10 SDOH — ECONOMIC STABILITY: HOUSING INSECURITY: EVIDENCE OF SMOKING MATERIAL: 0

## 2024-07-10 SDOH — HEALTH STABILITY: PHYSICAL HEALTH: EXERCISE COMMENTS: SITTING LAQ MARCHING AP  STANDING MINI SQUATS SHALLOW . TOE RAISES X 10. TOL WELL

## 2024-07-10 SDOH — HEALTH STABILITY: MENTAL HEALTH: SMOKING IN HOME: 0

## 2024-07-10 ASSESSMENT — ENCOUNTER SYMPTOMS
PAIN LOCATION - PAIN QUALITY: ACHE
PERSON REPORTING PAIN: PATIENT
LOSS OF SENSATION IN FEET: 0
PAIN LOCATION: PELVIS
PAIN LOCATION - PAIN SEVERITY: 5/10
PAIN: 1
MUSCLE WEAKNESS: 1
SUBJECTIVE PAIN PROGRESSION: GRADUALLY IMPROVING
OCCASIONAL FEELINGS OF UNSTEADINESS: 0
HIGHEST PAIN SEVERITY IN PAST 24 HOURS: 5/10
LOWEST PAIN SEVERITY IN PAST 24 HOURS: 3/10
DEPRESSION: 0

## 2024-07-10 ASSESSMENT — ACTIVITIES OF DAILY LIVING (ADL)
OASIS_M1830: 03
ENTERING_EXITING_HOME: MINIMUM ASSIST

## 2024-07-11 ENCOUNTER — HOME CARE VISIT (OUTPATIENT)
Dept: HOME HEALTH SERVICES | Facility: HOME HEALTH | Age: 74
End: 2024-07-11
Payer: MEDICARE

## 2024-07-15 ENCOUNTER — HOME CARE VISIT (OUTPATIENT)
Dept: HOME HEALTH SERVICES | Facility: HOME HEALTH | Age: 74
End: 2024-07-15
Payer: MEDICARE

## 2024-07-15 VITALS — TEMPERATURE: 98 F | SYSTOLIC BLOOD PRESSURE: 104 MMHG | DIASTOLIC BLOOD PRESSURE: 70 MMHG

## 2024-07-15 PROCEDURE — G0151 HHCP-SERV OF PT,EA 15 MIN: HCPCS

## 2024-07-15 ASSESSMENT — ENCOUNTER SYMPTOMS
HIGHEST PAIN SEVERITY IN PAST 24 HOURS: 5/10
AGITATION: 1
PERSON REPORTING PAIN: PATIENT
PAIN LOCATION - PAIN SEVERITY: 4/10
PAIN: 1
LOWEST PAIN SEVERITY IN PAST 24 HOURS: 3/10
PAIN LOCATION: PELVIS

## 2024-07-17 ENCOUNTER — HOME CARE VISIT (OUTPATIENT)
Dept: HOME HEALTH SERVICES | Facility: HOME HEALTH | Age: 74
End: 2024-07-17
Payer: MEDICARE

## 2024-07-17 VITALS — TEMPERATURE: 98.6 F

## 2024-07-17 PROCEDURE — G0151 HHCP-SERV OF PT,EA 15 MIN: HCPCS

## 2024-07-17 ASSESSMENT — ENCOUNTER SYMPTOMS
LOWEST PAIN SEVERITY IN PAST 24 HOURS: 3/10
PAIN: 1
HIGHEST PAIN SEVERITY IN PAST 24 HOURS: 5/10
PERSON REPORTING PAIN: PATIENT
PAIN LOCATION - PAIN SEVERITY: 4/10
PAIN LOCATION: RIGHT HIP

## 2024-07-23 ENCOUNTER — HOME CARE VISIT (OUTPATIENT)
Dept: HOME HEALTH SERVICES | Facility: HOME HEALTH | Age: 74
End: 2024-07-23
Payer: MEDICARE

## 2024-07-23 VITALS — TEMPERATURE: 98.4 F

## 2024-07-23 PROCEDURE — G0151 HHCP-SERV OF PT,EA 15 MIN: HCPCS

## 2024-07-23 ASSESSMENT — ENCOUNTER SYMPTOMS
PAIN LOCATION - PAIN SEVERITY: 4/10
HIGHEST PAIN SEVERITY IN PAST 24 HOURS: 4/10
PERSON REPORTING PAIN: PATIENT
PAIN LOCATION: PELVIS
PAIN: 1
LOWEST PAIN SEVERITY IN PAST 24 HOURS: 4/10

## 2024-07-24 ENCOUNTER — OFFICE VISIT (OUTPATIENT)
Dept: CARDIOLOGY | Facility: CLINIC | Age: 74
End: 2024-07-24
Payer: MEDICARE

## 2024-07-24 VITALS
OXYGEN SATURATION: 94 % | HEART RATE: 89 BPM | SYSTOLIC BLOOD PRESSURE: 124 MMHG | BODY MASS INDEX: 25.25 KG/M2 | WEIGHT: 125 LBS | DIASTOLIC BLOOD PRESSURE: 72 MMHG

## 2024-07-24 DIAGNOSIS — I35.0 NONRHEUMATIC AORTIC VALVE STENOSIS: ICD-10-CM

## 2024-07-24 DIAGNOSIS — I35.9 AORTIC VALVE DISORDER: Primary | ICD-10-CM

## 2024-07-24 PROCEDURE — 1125F AMNT PAIN NOTED PAIN PRSNT: CPT | Performed by: INTERNAL MEDICINE

## 2024-07-24 PROCEDURE — 99213 OFFICE O/P EST LOW 20 MIN: CPT | Performed by: INTERNAL MEDICINE

## 2024-07-24 PROCEDURE — 1157F ADVNC CARE PLAN IN RCRD: CPT | Performed by: INTERNAL MEDICINE

## 2024-07-24 PROCEDURE — 1159F MED LIST DOCD IN RCRD: CPT | Performed by: INTERNAL MEDICINE

## 2024-07-24 PROCEDURE — 1036F TOBACCO NON-USER: CPT | Performed by: INTERNAL MEDICINE

## 2024-07-24 ASSESSMENT — ENCOUNTER SYMPTOMS
NEUROLOGICAL NEGATIVE: 1
ENDOCRINE NEGATIVE: 1
DEPRESSION: 0
GASTROINTESTINAL NEGATIVE: 1
LOSS OF SENSATION IN FEET: 0
OCCASIONAL FEELINGS OF UNSTEADINESS: 1
COUGH: 0
FALLS: 1
RESPIRATORY NEGATIVE: 1
CHILLS: 0
FEVER: 0
CONSTITUTIONAL NEGATIVE: 1
EYES NEGATIVE: 1

## 2024-07-24 ASSESSMENT — PATIENT HEALTH QUESTIONNAIRE - PHQ9
SUM OF ALL RESPONSES TO PHQ9 QUESTIONS 1 AND 2: 0
2. FEELING DOWN, DEPRESSED OR HOPELESS: NOT AT ALL
1. LITTLE INTEREST OR PLEASURE IN DOING THINGS: NOT AT ALL

## 2024-07-24 ASSESSMENT — PAIN SCALES - GENERAL: PAINLEVEL: 4

## 2024-07-24 NOTE — ASSESSMENT & PLAN NOTE
She fell and fractured her pelvis.  The aortic measurements are the same in Jan. Will repeat the echo in jan and see afterwards.

## 2024-07-24 NOTE — PROGRESS NOTES
Subjective      Chief Complaint   Patient presents with    6 MONTH FOLLOW UP          An echocardiogram was done on 1/3/2024 showed that the aortic valve had moderate stenosis with a peak gradient of 37 mmHg with mean gradient of 25 mmHg. n 2022 the gradients were 37 mmHg and 25 mmHg respectively.  The left ventricular function is normal.  She was doing well until she broke her pelvis.  She is not complaining of chest discomfort.  No complaints of PND or orthopnea.  The legs are not swelling on her.  NO palpitaions.           Review of Systems   Constitutional: Negative. Negative for chills and fever.   HENT: Negative.     Eyes: Negative.    Respiratory: Negative.  Negative for cough.    Endocrine: Negative.    Skin: Negative.    Musculoskeletal:  Positive for falls.   Gastrointestinal: Negative.    Genitourinary: Negative.    Neurological: Negative.    All other systems reviewed and are negative.       No past surgical history on file.     Active Ambulatory Problems     Diagnosis Date Noted    Medical home patient 08/31/2023    Accidental fall 08/31/2023    Age-related osteoporosis with current pathological fracture 08/31/2023    Aortic valve calcification 08/31/2023    Aortic valve disorder 08/31/2023    Closed fracture of surgical neck of humerus 08/31/2023    Disorder of bone, unspecified 08/31/2023    Displaced apophyseal fracture of left femur, subsequent encounter for closed fracture with routine healing 08/31/2023    Injury of wrist 08/31/2023    Menopausal syndrome 08/31/2023    Mixed hyperlipidemia 08/31/2023    Multiple closed fractures of ribs of right side 08/31/2023    Murmur 08/31/2023    Osteopenia of left thigh 08/31/2023    Primary insomnia 08/31/2023    Primary osteoarthritis of right knee 08/31/2023    Strain of thoracic back region 08/31/2023    Tinnitus of left ear 08/31/2023     Resolved Ambulatory Problems     Diagnosis Date Noted    Essential hypertension 08/31/2023    Myeloproliferative  "disorder (Multi) 08/31/2023    Reactive depression 08/31/2023    Vitamin B 12 deficiency 08/31/2023    Vitamin D deficiency 08/31/2023    Fracture of right inferior pubic ramus, closed, initial encounter ([facility]) 06/19/2024    Ambulatory dysfunction 06/20/2024     Past Medical History:   Diagnosis Date    High cholesterol     Hypertension         Visit Vitals  /72   Pulse 89   Wt 56.7 kg (125 lb)   SpO2 94%   BMI 25.25 kg/m²   Smoking Status Former   BSA 1.54 m²        Objective     Constitutional:       Appearance: Healthy appearance.   Neck:      Vascular: No JVR.   Pulmonary:      Effort: Pulmonary effort is normal.      Breath sounds: Normal breath sounds.   Cardiovascular:      PMI at left midclavicular line. Normal rate. Regular rhythm. Normal S1. Normal S2.       Murmurs: There is a harsh midsystolic murmur at the URSB, radiating to the neck.      No gallop.  No click. No rub.   Pulses:     Intact distal pulses.   Abdominal:      Palpations: Abdomen is soft.   Musculoskeletal: Normal range of motion. Skin:     General: Skin is warm and dry.   Neurological:      General: No focal deficit present.            Lab Review:         Lab Results   Component Value Date    CHOL 214 (H) 06/18/2024    CHOL 188 06/13/2023    CHOL 218 (H) 06/07/2022     Lab Results   Component Value Date    HDL 66.0 06/18/2024    HDL 67 06/13/2023    HDL 68 06/07/2022     Lab Results   Component Value Date    LDLCALC 112 06/18/2024    LDLCALC 93 06/13/2023    LDLCALC 116 06/07/2022     Lab Results   Component Value Date    TRIG 179 (H) 06/18/2024    TRIG 142 06/13/2023    TRIG 170 (H) 06/07/2022     No components found for: \"CHOLHDL\"     Assessment/Plan     Aortic valve disorder  She fell and fractured her pelvis.  The aortic measurements are the same in Jan. Will repeat the echo in jan and see afterwards.     "

## 2024-07-25 ENCOUNTER — HOME CARE VISIT (OUTPATIENT)
Dept: HOME HEALTH SERVICES | Facility: HOME HEALTH | Age: 74
End: 2024-07-25
Payer: MEDICARE

## 2024-07-25 VITALS — TEMPERATURE: 99.1 F

## 2024-07-25 PROCEDURE — G0151 HHCP-SERV OF PT,EA 15 MIN: HCPCS

## 2024-07-25 SDOH — HEALTH STABILITY: PHYSICAL HEALTH: EXERCISE COMMENTS: SUPINE HEEL SLIDES HIP ABD BRIDGING X 10  SITTING. AP LAQ MAMRCHING X 20

## 2024-07-25 ASSESSMENT — ENCOUNTER SYMPTOMS
LOWEST PAIN SEVERITY IN PAST 24 HOURS: 2/10
PAIN LOCATION: LEFT HIP
PAIN: 1
PAIN LOCATION - PAIN SEVERITY: 5/10
HIGHEST PAIN SEVERITY IN PAST 24 HOURS: 5/10
PERSON REPORTING PAIN: PATIENT

## 2024-07-30 ENCOUNTER — HOME CARE VISIT (OUTPATIENT)
Dept: HOME HEALTH SERVICES | Facility: HOME HEALTH | Age: 74
End: 2024-07-30
Payer: MEDICARE

## 2024-07-30 VITALS
HEART RATE: 72 BPM | DIASTOLIC BLOOD PRESSURE: 70 MMHG | SYSTOLIC BLOOD PRESSURE: 118 MMHG | TEMPERATURE: 97.8 F | RESPIRATION RATE: 16 BRPM | OXYGEN SATURATION: 98 %

## 2024-07-30 PROCEDURE — G0157 HHC PT ASSISTANT EA 15: HCPCS | Mod: CQ

## 2024-07-30 ASSESSMENT — ENCOUNTER SYMPTOMS
PAIN: 1
PERSON REPORTING PAIN: PATIENT
LOWEST PAIN SEVERITY IN PAST 24 HOURS: 3/10
HIGHEST PAIN SEVERITY IN PAST 24 HOURS: 5/10

## 2024-08-01 ENCOUNTER — HOME CARE VISIT (OUTPATIENT)
Dept: HOME HEALTH SERVICES | Facility: HOME HEALTH | Age: 74
End: 2024-08-01
Payer: MEDICARE

## 2024-08-01 VITALS
RESPIRATION RATE: 16 BRPM | TEMPERATURE: 98.1 F | OXYGEN SATURATION: 98 % | HEART RATE: 70 BPM | SYSTOLIC BLOOD PRESSURE: 122 MMHG | DIASTOLIC BLOOD PRESSURE: 72 MMHG

## 2024-08-01 PROCEDURE — G0157 HHC PT ASSISTANT EA 15: HCPCS | Mod: CQ

## 2024-08-01 ASSESSMENT — ENCOUNTER SYMPTOMS
HIGHEST PAIN SEVERITY IN PAST 24 HOURS: 4/10
PAIN: 1
LOWEST PAIN SEVERITY IN PAST 24 HOURS: 4/10
PERSON REPORTING PAIN: PATIENT

## 2024-08-05 ENCOUNTER — HOME CARE VISIT (OUTPATIENT)
Dept: HOME HEALTH SERVICES | Facility: HOME HEALTH | Age: 74
End: 2024-08-05
Payer: MEDICARE

## 2024-08-05 VITALS — TEMPERATURE: 98.2 F

## 2024-08-05 DIAGNOSIS — F32.9 REACTIVE DEPRESSION: Primary | ICD-10-CM

## 2024-08-05 PROCEDURE — G0151 HHCP-SERV OF PT,EA 15 MIN: HCPCS

## 2024-08-05 RX ORDER — ESCITALOPRAM OXALATE 20 MG/1
20 TABLET ORAL DAILY
Qty: 90 TABLET | Refills: 1 | Status: SHIPPED | OUTPATIENT
Start: 2024-08-05 | End: 2025-02-01

## 2024-08-05 ASSESSMENT — ENCOUNTER SYMPTOMS
HIGHEST PAIN SEVERITY IN PAST 24 HOURS: 2/10
PAIN LOCATION - PAIN SEVERITY: 2/10
PAIN LOCATION: LEFT HIP
PAIN: 1
PERSON REPORTING PAIN: PATIENT
LOWEST PAIN SEVERITY IN PAST 24 HOURS: 2/10

## 2024-08-08 ENCOUNTER — HOME CARE VISIT (OUTPATIENT)
Dept: HOME HEALTH SERVICES | Facility: HOME HEALTH | Age: 74
End: 2024-08-08
Payer: MEDICARE

## 2024-08-08 VITALS — TEMPERATURE: 98 F | SYSTOLIC BLOOD PRESSURE: 128 MMHG | DIASTOLIC BLOOD PRESSURE: 92 MMHG

## 2024-08-08 PROCEDURE — G0151 HHCP-SERV OF PT,EA 15 MIN: HCPCS

## 2024-08-08 ASSESSMENT — ACTIVITIES OF DAILY LIVING (ADL)
OASIS_M1830: 01
HOME_HEALTH_OASIS: 00

## 2024-08-08 ASSESSMENT — ENCOUNTER SYMPTOMS
LOWEST PAIN SEVERITY IN PAST 24 HOURS: 0/10
PAIN LOCATION - PAIN SEVERITY: 0/10
PAIN: 1
HIGHEST PAIN SEVERITY IN PAST 24 HOURS: 3/10
PERSON REPORTING PAIN: PATIENT
PAIN LOCATION: PELVIS

## 2024-08-22 ENCOUNTER — LAB (OUTPATIENT)
Dept: LAB | Facility: CLINIC | Age: 74
End: 2024-08-22
Payer: MEDICARE

## 2024-08-22 DIAGNOSIS — E53.8 B12 DEFICIENCY: ICD-10-CM

## 2024-08-22 DIAGNOSIS — D47.3 ESSENTIAL THROMBOCYTOSIS (MULTI): ICD-10-CM

## 2024-08-22 LAB
BASOPHILS # BLD MANUAL: 0 X10*3/UL (ref 0–0.1)
BASOPHILS NFR BLD MANUAL: 0 %
DACRYOCYTES BLD QL SMEAR: ABNORMAL
EOSINOPHIL # BLD MANUAL: 0 X10*3/UL (ref 0–0.4)
EOSINOPHIL NFR BLD MANUAL: 0 %
ERYTHROCYTE [DISTWIDTH] IN BLOOD BY AUTOMATED COUNT: 14.6 % (ref 11.5–14.5)
HCT VFR BLD AUTO: 37.4 % (ref 36–46)
HGB BLD-MCNC: 12 G/DL (ref 12–16)
IMM GRANULOCYTES # BLD AUTO: 0.68 X10*3/UL (ref 0–0.5)
IMM GRANULOCYTES NFR BLD AUTO: 9.6 % (ref 0–0.9)
LYMPHOCYTES # BLD MANUAL: 1.07 X10*3/UL (ref 0.8–3)
LYMPHOCYTES NFR BLD MANUAL: 15 %
MCH RBC QN AUTO: 35.3 PG (ref 26–34)
MCHC RBC AUTO-ENTMCNC: 32.1 G/DL (ref 32–36)
MCV RBC AUTO: 110 FL (ref 80–100)
MONOCYTES # BLD MANUAL: 0.43 X10*3/UL (ref 0.05–0.8)
MONOCYTES NFR BLD MANUAL: 6 %
NEUTROPHILS # BLD MANUAL: 5.54 X10*3/UL (ref 1.6–5.5)
NEUTS BAND # BLD MANUAL: 0.21 X10*3/UL (ref 0–0.5)
NEUTS BAND NFR BLD MANUAL: 3 %
NEUTS HYPERSEG BLD QL SMEAR: PRESENT
NEUTS SEG # BLD MANUAL: 5.33 X10*3/UL (ref 1.6–5)
NEUTS SEG NFR BLD MANUAL: 75 %
NRBC BLD-RTO: 0 /100 WBCS (ref 0–0)
OVALOCYTES BLD QL SMEAR: ABNORMAL
PLATELET # BLD AUTO: 452 X10*3/UL (ref 150–450)
POLYCHROMASIA BLD QL SMEAR: ABNORMAL
RBC # BLD AUTO: 3.4 X10*6/UL (ref 4–5.2)
RBC MORPH BLD: ABNORMAL
TOTAL CELLS COUNTED BLD: 100
VARIANT LYMPHS # BLD MANUAL: 0.07 X10*3/UL (ref 0–0.3)
VARIANT LYMPHS NFR BLD: 1 %
WBC # BLD AUTO: 7.1 X10*3/UL (ref 4.4–11.3)

## 2024-08-22 PROCEDURE — 85027 COMPLETE CBC AUTOMATED: CPT

## 2024-08-22 PROCEDURE — 85007 BL SMEAR W/DIFF WBC COUNT: CPT

## 2024-08-22 PROCEDURE — 36415 COLL VENOUS BLD VENIPUNCTURE: CPT

## 2024-09-06 ENCOUNTER — HOSPITAL ENCOUNTER (OUTPATIENT)
Dept: RADIOLOGY | Facility: HOSPITAL | Age: 74
Discharge: HOME | End: 2024-09-06
Payer: MEDICARE

## 2024-09-06 VITALS — HEIGHT: 59 IN | WEIGHT: 125 LBS | BODY MASS INDEX: 25.2 KG/M2

## 2024-09-06 DIAGNOSIS — Z12.31 SCREENING MAMMOGRAM FOR BREAST CANCER: ICD-10-CM

## 2024-09-06 PROCEDURE — 77067 SCR MAMMO BI INCL CAD: CPT

## 2024-10-21 ENCOUNTER — HOSPITAL ENCOUNTER (OUTPATIENT)
Dept: RADIOLOGY | Facility: HOSPITAL | Age: 74
Discharge: HOME | End: 2024-10-21
Payer: MEDICARE

## 2024-10-21 DIAGNOSIS — M21.061 VALGUS DEFORMITY, NOT ELSEWHERE CLASSIFIED, RIGHT KNEE: ICD-10-CM

## 2024-10-21 DIAGNOSIS — M17.11 UNILATERAL PRIMARY OSTEOARTHRITIS, RIGHT KNEE: ICD-10-CM

## 2024-10-21 PROCEDURE — 73562 X-RAY EXAM OF KNEE 3: CPT | Mod: RIGHT SIDE | Performed by: RADIOLOGY

## 2024-10-21 PROCEDURE — 73562 X-RAY EXAM OF KNEE 3: CPT | Mod: RT

## 2024-10-31 ENCOUNTER — HOSPITAL ENCOUNTER (OUTPATIENT)
Dept: RADIOLOGY | Facility: HOSPITAL | Age: 74
Discharge: HOME | End: 2024-10-31
Payer: MEDICARE

## 2024-10-31 ENCOUNTER — OFFICE VISIT (OUTPATIENT)
Dept: URGENT CARE | Age: 74
End: 2024-10-31
Payer: MEDICARE

## 2024-10-31 VITALS
OXYGEN SATURATION: 98 % | HEIGHT: 59 IN | WEIGHT: 125 LBS | SYSTOLIC BLOOD PRESSURE: 148 MMHG | RESPIRATION RATE: 18 BRPM | BODY MASS INDEX: 25.2 KG/M2 | DIASTOLIC BLOOD PRESSURE: 82 MMHG | TEMPERATURE: 98 F

## 2024-10-31 DIAGNOSIS — J40 BRONCHITIS: Primary | ICD-10-CM

## 2024-10-31 DIAGNOSIS — J40 BRONCHITIS: ICD-10-CM

## 2024-10-31 PROCEDURE — 71046 X-RAY EXAM CHEST 2 VIEWS: CPT

## 2024-10-31 PROCEDURE — 1036F TOBACCO NON-USER: CPT | Performed by: SPECIALIST

## 2024-10-31 PROCEDURE — 99203 OFFICE O/P NEW LOW 30 MIN: CPT | Performed by: SPECIALIST

## 2024-10-31 PROCEDURE — 1157F ADVNC CARE PLAN IN RCRD: CPT | Performed by: SPECIALIST

## 2024-10-31 PROCEDURE — 1160F RVW MEDS BY RX/DR IN RCRD: CPT | Performed by: SPECIALIST

## 2024-10-31 PROCEDURE — 1159F MED LIST DOCD IN RCRD: CPT | Performed by: SPECIALIST

## 2024-10-31 RX ORDER — METHYLPREDNISOLONE 4 MG/1
TABLET ORAL
Qty: 21 TABLET | Refills: 0 | Status: SHIPPED | OUTPATIENT
Start: 2024-10-31 | End: 2024-11-07

## 2024-10-31 RX ORDER — DOXYCYCLINE 100 MG/1
100 CAPSULE ORAL 2 TIMES DAILY
Qty: 14 CAPSULE | Refills: 0 | Status: SHIPPED | OUTPATIENT
Start: 2024-10-31 | End: 2024-11-07

## 2024-10-31 ASSESSMENT — ENCOUNTER SYMPTOMS
COUGH: 1
CARDIOVASCULAR NEGATIVE: 1

## 2024-11-04 ENCOUNTER — APPOINTMENT (OUTPATIENT)
Dept: HEMATOLOGY/ONCOLOGY | Facility: CLINIC | Age: 74
End: 2024-11-04
Payer: MEDICARE

## 2024-11-14 ENCOUNTER — TELEMEDICINE (OUTPATIENT)
Dept: PRIMARY CARE | Facility: CLINIC | Age: 74
End: 2024-11-14
Payer: MEDICARE

## 2024-11-14 VITALS — HEIGHT: 59 IN | RESPIRATION RATE: 16 BRPM | BODY MASS INDEX: 25.92 KG/M2 | WEIGHT: 128.6 LBS

## 2024-11-14 DIAGNOSIS — R05.1 ACUTE COUGH: Primary | ICD-10-CM

## 2024-11-14 DIAGNOSIS — R09.82 PND (POST-NASAL DRIP): ICD-10-CM

## 2024-11-14 PROCEDURE — 1159F MED LIST DOCD IN RCRD: CPT

## 2024-11-14 PROCEDURE — 1036F TOBACCO NON-USER: CPT

## 2024-11-14 PROCEDURE — 99441 PR PHYS/QHP TELEPHONE EVALUATION 5-10 MIN: CPT

## 2024-11-14 PROCEDURE — 1157F ADVNC CARE PLAN IN RCRD: CPT

## 2024-11-14 PROCEDURE — 1124F ACP DISCUSS-NO DSCNMKR DOCD: CPT

## 2024-11-14 PROCEDURE — 1125F AMNT PAIN NOTED PAIN PRSNT: CPT

## 2024-11-14 RX ORDER — BENZONATATE 100 MG/1
100 CAPSULE ORAL 3 TIMES DAILY PRN
Qty: 42 CAPSULE | Refills: 0 | Status: SHIPPED | OUTPATIENT
Start: 2024-11-14 | End: 2024-11-28

## 2024-11-14 ASSESSMENT — COLUMBIA-SUICIDE SEVERITY RATING SCALE - C-SSRS
6. HAVE YOU EVER DONE ANYTHING, STARTED TO DO ANYTHING, OR PREPARED TO DO ANYTHING TO END YOUR LIFE?: NO
2. HAVE YOU ACTUALLY HAD ANY THOUGHTS OF KILLING YOURSELF?: NO
1. IN THE PAST MONTH, HAVE YOU WISHED YOU WERE DEAD OR WISHED YOU COULD GO TO SLEEP AND NOT WAKE UP?: NO

## 2024-11-14 ASSESSMENT — ENCOUNTER SYMPTOMS
DEPRESSION: 0
OCCASIONAL FEELINGS OF UNSTEADINESS: 0
LOSS OF SENSATION IN FEET: 0

## 2024-11-14 ASSESSMENT — PAIN SCALES - GENERAL: PAINLEVEL_OUTOF10: 3

## 2024-11-14 NOTE — PROGRESS NOTES
"With patients permission, this is a Telemedicine visit with video. Provider located at the office address. Patient located at their home address. The provider and patient participated in this telemedicine encounter.     Subjective   Patient ID: Victoria Trotter is a 74 y.o. female who presents for Cough.    HPI   Patient presented to  urgent care will be October 31 with complaints of cough x 2-1/2 weeks.  Chest x-ray ordered negative for acute abnormality  She was treated with doxycycline 100 mg twice daily x 7 days and a Medrol Dosepak.  She has since been taking robitussin \"a whole bottle\" mucinex and entire box\" increased fluids and sleeping with HOB elevated.   Notes cough is forceful and causing urinary leakage.     Review of Systems  Review of Systems negative except as noted in HPI and Chief complaint.    Objective   Resp 16   Ht 1.499 m (4' 11\")   Wt 58.3 kg (128 lb 9.6 oz)   BMI 25.97 kg/m²     Physical Exam  Physical exam not performed due to telemedicine encounter.   Assessment/Plan   Diagnoses and all orders for this visit:  Acute cough  PND (post-nasal drip)    PND  Continue mucinex as directed on the box  Blow nose often  Cough any sputum in the back of your throat when able    Cough   Begin Tessalon Perles up to 3 times daily as needed for cough.  Increase fluids 8 to 10 glasses of water per day  Continue to sleep with head of your bed elevated  Continue Mucinex  Blow your nose often  Wash hands often  Use a humidifier next to your bed if you have one  Call the office if no improvement in symptoms over the next 3 to 5 days  Consider repeat chest x-ray  Go to the emergency room if you should have any shortness of breath chest pains or chest pressure  This note was dictated using DRAGON speech recognition software and was corrected for spelling or grammatical errors, but despite proofreading several typographical errors might be present that might affect the meaning of the content.  Mimi Marlow, " CNP  Advanced care planning was discussed with Victoria Trotter today. We reviewed code status, Medical Power of , and Living will. Pt has LW or POA.

## 2024-11-22 DIAGNOSIS — E78.2 MIXED HYPERLIPIDEMIA: ICD-10-CM

## 2024-11-25 RX ORDER — ATORVASTATIN CALCIUM 20 MG/1
20 TABLET, FILM COATED ORAL DAILY
Qty: 90 TABLET | Refills: 1 | Status: SHIPPED | OUTPATIENT
Start: 2024-11-25

## 2024-11-29 ENCOUNTER — OFFICE VISIT (OUTPATIENT)
Dept: URGENT CARE | Age: 74
End: 2024-11-29
Payer: MEDICARE

## 2024-11-29 VITALS
OXYGEN SATURATION: 96 % | WEIGHT: 130 LBS | BODY MASS INDEX: 26.21 KG/M2 | DIASTOLIC BLOOD PRESSURE: 73 MMHG | HEART RATE: 65 BPM | RESPIRATION RATE: 20 BRPM | HEIGHT: 59 IN | TEMPERATURE: 98.3 F | SYSTOLIC BLOOD PRESSURE: 147 MMHG

## 2024-11-29 DIAGNOSIS — J02.9 PHARYNGITIS, UNSPECIFIED ETIOLOGY: Primary | ICD-10-CM

## 2024-11-29 DIAGNOSIS — J02.9 SORE THROAT: ICD-10-CM

## 2024-11-29 LAB — POC RAPID STREP: NEGATIVE

## 2024-11-29 PROCEDURE — 87651 STREP A DNA AMP PROBE: CPT

## 2024-11-29 ASSESSMENT — ENCOUNTER SYMPTOMS
COUGH: 1
SORE THROAT: 1

## 2024-11-29 ASSESSMENT — PAIN SCALES - GENERAL: PAINLEVEL_OUTOF10: 4

## 2024-11-29 NOTE — PROGRESS NOTES
"Subjective   Patient ID: Victoria Trotter is a 74 y.o. female. They present today with a chief complaint of Cough (X 6 weeks. ) and Sore Throat (Sore throat, dry mouth, pain while swallowing. Concerned for strep. ).    History of Present Illness  Victoria Trotter is a 74 y.o. female who presents to the clinic for sore throat for the past 3 days.  Pt denies any chest pain, sob, N/V at this time in clinic.             Past Medical History  Allergies as of 11/29/2024    (No Known Allergies)       (Not in a hospital admission)       Past Medical History:   Diagnosis Date    High cholesterol     Hypertension        No past surgical history on file.     reports that she quit smoking about 48 years ago. Her smoking use included cigarettes. She started smoking about 58 years ago. She has a 2.5 pack-year smoking history. She has been exposed to tobacco smoke. She has never used smokeless tobacco. She reports current alcohol use of about 2.0 standard drinks of alcohol per week. She reports that she does not use drugs.    Review of Systems  Review of Systems   HENT:  Positive for sore throat.    Respiratory:  Positive for cough.    All other systems reviewed and are negative.                                 Objective    Vitals:    11/29/24 1034   BP: 147/73   BP Location: Left arm   Patient Position: Sitting   BP Cuff Size: Adult   Pulse: 65   Resp: 20   Temp: 36.8 °C (98.3 °F)   TempSrc: Oral   SpO2: 96%   Weight: 59 kg (130 lb)   Height: 1.499 m (4' 11\")     No LMP recorded. Patient is postmenopausal.    Physical Exam  Constitutional:       Appearance: Normal appearance.   HENT:      Right Ear: Tympanic membrane normal.      Left Ear: Tympanic membrane normal.      Mouth/Throat:      Pharynx: Posterior oropharyngeal erythema present.   Eyes:      Extraocular Movements: Extraocular movements intact.      Conjunctiva/sclera: Conjunctivae normal.      Pupils: Pupils are equal, round, and reactive to light.   Cardiovascular:      Rate " and Rhythm: Normal rate and regular rhythm.   Pulmonary:      Effort: Pulmonary effort is normal.      Breath sounds: Normal breath sounds.   Neurological:      General: No focal deficit present.      Mental Status: She is alert and oriented to person, place, and time. Mental status is at baseline.   Psychiatric:         Mood and Affect: Mood normal.         Behavior: Behavior normal.         Procedures    Point of Care Test & Imaging Results from this visit  Results for orders placed or performed in visit on 11/29/24   POCT rapid strep A manually resulted   Result Value Ref Range    POC Rapid Strep Negative Negative      No results found.    Diagnostic study results (if any) were reviewed by PERLA Tafoya.    Assessment/Plan   Allergies, medications, history, and pertinent labs/EKGs/Imaging reviewed by PERLA Tafoya.     Medical Decision Making  Signs and symptoms consistent with acute viral pharyngitis without evidence of PTA, mono, deep neck infection, or sepsis. Negative Rapid strep in office. Will treat with supportive measures. Patient is encouraged to return to clinic if symptoms worsen and will otherwise follow with PCP. Patient verbalized understanding and agrees with plan.   -strep PCR send out- if positive, will call and start ABT    Orders and Diagnoses  Diagnoses and all orders for this visit:  Sore throat  -     POCT rapid strep A manually resulted      Medical Admin Record      Patient disposition: Home    Electronically signed by PERLA Tafoya  12:42 PM

## 2024-11-29 NOTE — PATIENT INSTRUCTIONS
Strep PCR- if positive, will call and start ABT  Warm salt water gargles two times a day  Cepacol   If worsening, please go to ER    Please follow up with your primary provider within one week if symptoms do not improve.  You may schedule an appointment online at [email or web address] or call [phone]. Go to the Emergency Department if symptoms significantly worsen or if you develop chest pain or shortness of breath.

## 2024-11-30 LAB — S PYO DNA THROAT QL NAA+PROBE: NOT DETECTED

## 2024-12-09 ENCOUNTER — APPOINTMENT (OUTPATIENT)
Dept: HEMATOLOGY/ONCOLOGY | Facility: CLINIC | Age: 74
End: 2024-12-09
Payer: MEDICARE

## 2024-12-13 ENCOUNTER — TELEPHONE (OUTPATIENT)
Dept: HEMATOLOGY/ONCOLOGY | Facility: CLINIC | Age: 74
End: 2024-12-13
Payer: MEDICARE

## 2024-12-13 NOTE — TELEPHONE ENCOUNTER
Pinky Otero with request to have her labs drawn before her appt on Monday with Erna if able.  She stated she was unsure if she would be able but would try. She verbalized understanding with teach-back method.

## 2024-12-14 ENCOUNTER — LAB (OUTPATIENT)
Dept: LAB | Facility: LAB | Age: 74
End: 2024-12-14
Payer: MEDICARE

## 2024-12-14 DIAGNOSIS — D47.3 ESSENTIAL THROMBOCYTOSIS: ICD-10-CM

## 2024-12-14 LAB
ALBUMIN SERPL BCP-MCNC: 4.4 G/DL (ref 3.4–5)
ALP SERPL-CCNC: 56 U/L (ref 33–136)
ALT SERPL W P-5'-P-CCNC: 8 U/L (ref 7–45)
ANION GAP SERPL CALCULATED.3IONS-SCNC: 11 MMOL/L (ref 10–20)
AST SERPL W P-5'-P-CCNC: 14 U/L (ref 9–39)
BASOPHILS # BLD MANUAL: 0.08 X10*3/UL (ref 0–0.1)
BASOPHILS NFR BLD MANUAL: 2 %
BILIRUB SERPL-MCNC: 0.5 MG/DL (ref 0–1.2)
BUN SERPL-MCNC: 17 MG/DL (ref 6–23)
CALCIUM SERPL-MCNC: 9.3 MG/DL (ref 8.6–10.3)
CHLORIDE SERPL-SCNC: 103 MMOL/L (ref 98–107)
CO2 SERPL-SCNC: 29 MMOL/L (ref 21–32)
CREAT SERPL-MCNC: 0.76 MG/DL (ref 0.5–1.05)
EGFRCR SERPLBLD CKD-EPI 2021: 82 ML/MIN/1.73M*2
EOSINOPHIL # BLD MANUAL: 0.04 X10*3/UL (ref 0–0.4)
EOSINOPHIL NFR BLD MANUAL: 1 %
ERYTHROCYTE [DISTWIDTH] IN BLOOD BY AUTOMATED COUNT: 15 % (ref 11.5–14.5)
GLUCOSE SERPL-MCNC: 82 MG/DL (ref 74–99)
HCT VFR BLD AUTO: 37 % (ref 36–46)
HGB BLD-MCNC: 12.2 G/DL (ref 12–16)
IMM GRANULOCYTES # BLD AUTO: 0.34 X10*3/UL (ref 0–0.5)
IMM GRANULOCYTES NFR BLD AUTO: 8.9 % (ref 0–0.9)
LDH SERPL L TO P-CCNC: 189 U/L (ref 84–246)
LYMPHOCYTES # BLD MANUAL: 1.06 X10*3/UL (ref 0.8–3)
LYMPHOCYTES NFR BLD MANUAL: 28 %
MCH RBC QN AUTO: 35.7 PG (ref 26–34)
MCHC RBC AUTO-ENTMCNC: 33 G/DL (ref 32–36)
MCV RBC AUTO: 108 FL (ref 80–100)
MONOCYTES # BLD MANUAL: 0.19 X10*3/UL (ref 0.05–0.8)
MONOCYTES NFR BLD MANUAL: 5 %
NEUTROPHILS # BLD MANUAL: 2.43 X10*3/UL (ref 1.6–5.5)
NEUTS BAND # BLD MANUAL: 0.04 X10*3/UL (ref 0–0.5)
NEUTS BAND NFR BLD MANUAL: 1 %
NEUTS SEG # BLD MANUAL: 2.39 X10*3/UL (ref 1.6–5)
NEUTS SEG NFR BLD MANUAL: 63 %
NRBC BLD-RTO: 0 /100 WBCS (ref 0–0)
PLATELET # BLD AUTO: 425 X10*3/UL (ref 150–450)
POLYCHROMASIA BLD QL SMEAR: NORMAL
POTASSIUM SERPL-SCNC: 4.3 MMOL/L (ref 3.5–5.3)
PROT SERPL-MCNC: 6.3 G/DL (ref 6.4–8.2)
RBC # BLD AUTO: 3.42 X10*6/UL (ref 4–5.2)
RBC MORPH BLD: NORMAL
SODIUM SERPL-SCNC: 139 MMOL/L (ref 136–145)
TOTAL CELLS COUNTED BLD: 100
WBC # BLD AUTO: 3.8 X10*3/UL (ref 4.4–11.3)

## 2024-12-14 PROCEDURE — 83615 LACTATE (LD) (LDH) ENZYME: CPT

## 2024-12-14 PROCEDURE — 85007 BL SMEAR W/DIFF WBC COUNT: CPT

## 2024-12-14 PROCEDURE — 85027 COMPLETE CBC AUTOMATED: CPT

## 2024-12-14 PROCEDURE — 80053 COMPREHEN METABOLIC PANEL: CPT

## 2024-12-14 PROCEDURE — 82607 VITAMIN B-12: CPT

## 2024-12-14 PROCEDURE — 36415 COLL VENOUS BLD VENIPUNCTURE: CPT

## 2024-12-16 ENCOUNTER — OFFICE VISIT (OUTPATIENT)
Dept: HEMATOLOGY/ONCOLOGY | Facility: CLINIC | Age: 74
End: 2024-12-16
Payer: MEDICARE

## 2024-12-16 VITALS
WEIGHT: 126.54 LBS | SYSTOLIC BLOOD PRESSURE: 126 MMHG | HEART RATE: 86 BPM | BODY MASS INDEX: 25.56 KG/M2 | OXYGEN SATURATION: 97 % | RESPIRATION RATE: 16 BRPM | DIASTOLIC BLOOD PRESSURE: 73 MMHG | TEMPERATURE: 97.2 F

## 2024-12-16 DIAGNOSIS — E53.8 VITAMIN B12 DEFICIENCY: ICD-10-CM

## 2024-12-16 DIAGNOSIS — D47.3 ESSENTIAL THROMBOCYTOSIS: Primary | ICD-10-CM

## 2024-12-16 LAB — VIT B12 SERPL-MCNC: 1495 PG/ML (ref 211–911)

## 2024-12-16 PROCEDURE — 99214 OFFICE O/P EST MOD 30 MIN: CPT | Performed by: NURSE PRACTITIONER

## 2024-12-16 PROCEDURE — 1157F ADVNC CARE PLAN IN RCRD: CPT | Performed by: NURSE PRACTITIONER

## 2024-12-16 PROCEDURE — 1159F MED LIST DOCD IN RCRD: CPT | Performed by: NURSE PRACTITIONER

## 2024-12-16 PROCEDURE — 1126F AMNT PAIN NOTED NONE PRSNT: CPT | Performed by: NURSE PRACTITIONER

## 2024-12-16 ASSESSMENT — PAIN SCALES - GENERAL: PAINLEVEL_OUTOF10: 0-NO PAIN

## 2024-12-16 NOTE — PROGRESS NOTES
Patient here for follow up visit with Erna Figueroa for Dx of  Thrombocytosis Patient here alone    Medications and Allergies reviewed and reconciled this visit.    No concerns or complaints noted at this time.     Pt reports appetite is  good.   Pt reports N/V/D :denies    Pt reports pain :denies      Follow up per PA request.    Pt. reports availability and use of mychart, Reviewed this is a good place to communicate with the team as well as review labs and upcoming orders.     No barriers to education noted, patient agrees to current plan and verbalized understanding using teach back method.

## 2024-12-16 NOTE — PROGRESS NOTES
Patient ID: Victoria Trotter is a 74 y.o. female.    Primary Care Provider: PERLA Torres  Visit Type: Follow Up      Subjective    HPI  Patient returns today for routine follow up evaluation for history of essential thrombocytosis diagnosed by Vipul Ray M.D., bone marrow biopsy in 2004, JAK2 gene mutation negative, currently well controlled with hydroxyurea.   Macrocytosis with history of vitamin B12 deficiency. Remains on oral vitamin B12. Had previously been on monthly B12 injections in our clinic.   Patient remains on hydroxyurea 500 mg daily.  This is overall kept her counts stable.  She has not had any fever, chills or night sweats.  No cough, chest pain or shortness of breath.  No nausea or vomiting.  No constipation or diarrhea. No VTE. No signs or symptoms of active bleeding or unusual bruising.      Review of Systems - Oncology Asymptomatic    Objective   BSA: 1.55 meters squared  /73 (BP Location: Right arm, Patient Position: Sitting, BP Cuff Size: Adult)   Pulse 86   Temp 36.2 °C (97.2 °F) (Temporal)   Resp 16   Wt 57.4 kg (126 lb 8.7 oz)   SpO2 97%   BMI 25.56 kg/m²      has a past medical history of High cholesterol and Hypertension.   has no past surgical history on file.  Family History   Problem Relation Name Age of Onset    Breast cancer Mother      Throat cancer Father           Victoria Trotter  reports that she quit smoking about 48 years ago. Her smoking use included cigarettes. She started smoking about 58 years ago. She has a 2.5 pack-year smoking history. She has been exposed to tobacco smoke. She has never used smokeless tobacco.  She  reports current alcohol use of about 2.0 standard drinks of alcohol per week.  She  reports no history of drug use.    Physical Exam  Constitutional:       Appearance: Normal appearance.   Eyes:      Conjunctiva/sclera: Conjunctivae normal.      Pupils: Pupils are equal, round, and reactive to light.   Cardiovascular:      Rate  and Rhythm: Normal rate and regular rhythm.      Heart sounds: Murmur heard.      Comments: Intially harsh rumbling tone to murmur, grd 2/6  Pulmonary:      Effort: Pulmonary effort is normal. No respiratory distress.      Breath sounds: Normal breath sounds.   Abdominal:      General: There is no distension.      Palpations: There is no mass.      Tenderness: There is no abdominal tenderness.   Musculoskeletal:         General: Normal range of motion.   Lymphadenopathy:      Cervical: No cervical adenopathy.   Skin:     Coloration: Skin is not jaundiced or pale.      Findings: No bruising or erythema.   Neurological:      Motor: No weakness.     WBC   Date/Time Value Ref Range Status   12/14/2024 10:42 AM 3.8 (L) 4.4 - 11.3 x10*3/uL Final   08/22/2024 09:53 AM 7.1 4.4 - 11.3 x10*3/uL Final   06/20/2024 05:29 AM 5.6 4.4 - 11.3 x10*3/uL Final     nRBC   Date Value Ref Range Status   12/14/2024 0.0 0.0 - 0.0 /100 WBCs Final   08/22/2024 0.0 0.0 - 0.0 /100 WBCs Final   06/20/2024 0.0 0.0 - 0.0 /100 WBCs Final     RBC   Date Value Ref Range Status   12/14/2024 3.42 (L) 4.00 - 5.20 x10*6/uL Final   08/22/2024 3.40 (L) 4.00 - 5.20 x10*6/uL Final   06/20/2024 3.16 (L) 4.00 - 5.20 x10*6/uL Final     Hemoglobin   Date Value Ref Range Status   12/14/2024 12.2 12.0 - 16.0 g/dL Final   08/22/2024 12.0 12.0 - 16.0 g/dL Final   06/20/2024 11.1 (L) 12.0 - 16.0 g/dL Final     Hematocrit   Date Value Ref Range Status   12/14/2024 37.0 36.0 - 46.0 % Final   08/22/2024 37.4 36.0 - 46.0 % Final   06/20/2024 34.6 (L) 36.0 - 46.0 % Final     MCV   Date/Time Value Ref Range Status   12/14/2024 10:42  (H) 80 - 100 fL Final   08/22/2024 09:53  (H) 80 - 100 fL Final   06/20/2024 05:29  (H) 80 - 100 fL Final     MCH   Date/Time Value Ref Range Status   12/14/2024 10:42 AM 35.7 (H) 26.0 - 34.0 pg Final   08/22/2024 09:53 AM 35.3 (H) 26.0 - 34.0 pg Final   06/20/2024 05:29 AM 35.1 (H) 26.0 - 34.0 pg Final     MCHC   Date/Time  Value Ref Range Status   12/14/2024 10:42 AM 33.0 32.0 - 36.0 g/dL Final   08/22/2024 09:53 AM 32.1 32.0 - 36.0 g/dL Final   06/20/2024 05:29 AM 32.1 32.0 - 36.0 g/dL Final     RDW   Date/Time Value Ref Range Status   12/14/2024 10:42 AM 15.0 (H) 11.5 - 14.5 % Final   08/22/2024 09:53 AM 14.6 (H) 11.5 - 14.5 % Final   06/20/2024 05:29 AM 15.3 (H) 11.5 - 14.5 % Final     Platelets   Date/Time Value Ref Range Status   12/14/2024 10:42  150 - 450 x10*3/uL Final   08/22/2024 09:53  (H) 150 - 450 x10*3/uL Final   06/20/2024 05:29  150 - 450 x10*3/uL Final     MPV   Date/Time Value Ref Range Status   06/13/2023 09:27 AM 9.9 7.0 - 12.6 CU Final   05/04/2022 09:23 AM 9.8 7.0 - 12.6 CU Final   11/29/2021 10:44 AM 9.4 7.0 - 12.6 CU Final     Neutrophils %   Date/Time Value Ref Range Status   11/13/2023 08:42 AM 52.4 40.0 - 80.0 % Final   05/10/2023 08:59 AM 53.3 40.0 - 80.0 % Final   11/14/2022 08:59 AM 57.0 40.0 - 80.0 % Final     Immature Granulocytes %, Automated   Date/Time Value Ref Range Status   12/14/2024 10:42 AM 8.9 (H) 0.0 - 0.9 % Final     Comment:     Immature Granulocyte Count (IG) includes promyelocytes, myelocytes and metamyelocytes but does not include bands. Percent differential counts (%) should be interpreted in the context of the absolute cell counts (cells/UL).   08/22/2024 09:53 AM 9.6 (H) 0.0 - 0.9 % Final     Comment:     Immature Granulocyte Count (IG) includes promyelocytes, myelocytes and metamyelocytes but does not include bands. Percent differential counts (%) should be interpreted in the context of the absolute cell counts (cells/UL).   06/19/2024 01:40 PM 8.0 (H) 0.0 - 0.9 % Final     Comment:     Immature Granulocyte Count (IG) includes promyelocytes, myelocytes and metamyelocytes but does not include bands. Percent differential counts (%) should be interpreted in the context of the absolute cell counts (cells/UL).     Lymphocytes %, Manual   Date/Time Value Ref Range Status    12/14/2024 10:42 AM 28.0 13.0 - 44.0 % Final   08/22/2024 09:53 AM 15.0 13.0 - 44.0 % Final   06/19/2024 01:40 PM 30.0 13.0 - 44.0 % Final     Monocytes %, Manual   Date/Time Value Ref Range Status   12/14/2024 10:42 AM 5.0 2.0 - 10.0 % Final   08/22/2024 09:53 AM 6.0 2.0 - 10.0 % Final   06/19/2024 01:40 PM 5.0 2.0 - 10.0 % Final     Eosinophils %, Manual   Date/Time Value Ref Range Status   12/14/2024 10:42 AM 1.0 0.0 - 6.0 % Final   08/22/2024 09:53 AM 0.0 0.0 - 6.0 % Final   06/19/2024 01:40 PM 5.0 0.0 - 6.0 % Final     Basophils %, Manual   Date/Time Value Ref Range Status   12/14/2024 10:42 AM 2.0 0.0 - 2.0 % Final   08/22/2024 09:53 AM 0.0 0.0 - 2.0 % Final   06/19/2024 01:40 PM 2.0 0.0 - 2.0 % Final     Neutrophils Absolute   Date/Time Value Ref Range Status   11/13/2023 08:42 AM 1.87 1.60 - 5.50 x10*3/uL Final     Comment:     Percent differential counts (%) should be interpreted in the context of the absolute cell counts (cells/uL).   06/13/2023 09:27 AM 3.61 1.8 - 7.7 K/UL Final   05/10/2023 08:59 AM 2.48 1.60 - 5.50 x10E9/L Final   11/14/2022 08:59 AM 2.64 1.60 - 5.50 x10E9/L Final     Immature Granulocytes Absolute, Automated   Date/Time Value Ref Range Status   12/14/2024 10:42 AM 0.34 0.00 - 0.50 x10*3/uL Final   08/22/2024 09:53 AM 0.68 (H) 0.00 - 0.50 x10*3/uL Final   06/19/2024 01:40 PM 0.56 (H) 0.00 - 0.50 x10*3/uL Final     Lymphocytes Absolute   Date/Time Value Ref Range Status   11/13/2023 08:42 AM 1.18 0.80 - 3.00 x10*3/uL Final   06/13/2023 09:27 AM 1.18 (L) 1.2 - 3.2 K/UL Final   05/10/2023 08:59 AM 1.30 0.80 - 3.00 x10E9/L Final   11/14/2022 08:59 AM 1.25 0.80 - 3.00 x10E9/L Final     Monocytes Absolute   Date/Time Value Ref Range Status   11/13/2023 08:42 AM 0.29 0.05 - 0.80 x10*3/uL Final   06/13/2023 09:27 AM 0.71 0 - 0.8 K/UL Final   05/10/2023 08:59 AM 0.39 0.05 - 0.80 x10E9/L Final   11/14/2022 08:59 AM 0.29 0.05 - 0.80 x10E9/L Final     Eosinophils Absolute, Manual   Date/Time  Value Ref Range Status   12/14/2024 10:42 AM 0.04 0.00 - 0.40 x10*3/uL Final   08/22/2024 09:53 AM 0.00 0.00 - 0.40 x10*3/uL Final   06/19/2024 01:40 PM 0.35 0.00 - 0.40 x10*3/uL Final     Basophils Absolute, Manual   Date/Time Value Ref Range Status   12/14/2024 10:42 AM 0.08 0.00 - 0.10 x10*3/uL Final   08/22/2024 09:53 AM 0.00 0.00 - 0.10 x10*3/uL Final   06/19/2024 01:40 PM 0.14 (H) 0.00 - 0.10 x10*3/uL Final       Assessment/Plan    Myeloproliferative disorder (essential thrombocytosis) :  Patient will continue hydroxyurea 500 mg daily and aspirin 81 mg daily     Vitamin B12 deficiency:   She will continue to take oral B12.     Immune prophylaxis:   The patient will continue with the flu shot every fall and the Pneumovax every five years.  She is planning to get her shingles vaccine. She has gotten her COVID booster.      Plan:  - 6 month follow-up visit  - - labs  6 months (CBCd, CMP and LDH)     Diagnoses and all orders for this visit:  Essential thrombocytosis  -     CBC and Auto Differential; Future  -     Comprehensive Metabolic Panel; Future  -     Lactate Dehydrogenase; Future  -     CBC and Auto Differential; Future  -     Comprehensive Metabolic Panel; Future  -     Lactate Dehydrogenase; Future  -     Clinic Appointment Request Follow up; Future  Vitamin B12 deficiency  -     Vitamin B12; Future  -     Vitamin B12; Future           Erna Figueroa PA-C

## 2024-12-18 ENCOUNTER — OFFICE VISIT (OUTPATIENT)
Dept: PRIMARY CARE | Facility: CLINIC | Age: 74
End: 2024-12-18
Payer: MEDICARE

## 2024-12-18 VITALS
RESPIRATION RATE: 16 BRPM | BODY MASS INDEX: 25.84 KG/M2 | SYSTOLIC BLOOD PRESSURE: 115 MMHG | DIASTOLIC BLOOD PRESSURE: 76 MMHG | HEART RATE: 67 BPM | WEIGHT: 128.2 LBS | OXYGEN SATURATION: 96 % | HEIGHT: 59 IN

## 2024-12-18 DIAGNOSIS — E78.2 MIXED HYPERLIPIDEMIA: Primary | ICD-10-CM

## 2024-12-18 PROCEDURE — 1159F MED LIST DOCD IN RCRD: CPT

## 2024-12-18 PROCEDURE — 99214 OFFICE O/P EST MOD 30 MIN: CPT

## 2024-12-18 PROCEDURE — 1036F TOBACCO NON-USER: CPT

## 2024-12-18 PROCEDURE — 1157F ADVNC CARE PLAN IN RCRD: CPT

## 2024-12-18 PROCEDURE — 1124F ACP DISCUSS-NO DSCNMKR DOCD: CPT

## 2024-12-18 PROCEDURE — 1125F AMNT PAIN NOTED PAIN PRSNT: CPT

## 2024-12-18 ASSESSMENT — ENCOUNTER SYMPTOMS
OCCASIONAL FEELINGS OF UNSTEADINESS: 0
DEPRESSION: 0
LOSS OF SENSATION IN FEET: 0

## 2024-12-18 ASSESSMENT — COLUMBIA-SUICIDE SEVERITY RATING SCALE - C-SSRS
1. IN THE PAST MONTH, HAVE YOU WISHED YOU WERE DEAD OR WISHED YOU COULD GO TO SLEEP AND NOT WAKE UP?: NO
2. HAVE YOU ACTUALLY HAD ANY THOUGHTS OF KILLING YOURSELF?: NO
6. HAVE YOU EVER DONE ANYTHING, STARTED TO DO ANYTHING, OR PREPARED TO DO ANYTHING TO END YOUR LIFE?: NO

## 2024-12-18 ASSESSMENT — PAIN SCALES - GENERAL: PAINLEVEL_OUTOF10: 4

## 2024-12-18 NOTE — PROGRESS NOTES
Subjective   Patient ID: Victoria Trotter is a 74 y.o. female who presents for 6 Month follow up.    HPI   Patient denies any falls, urgent care, ER, hospitalization, new diagnoses, surgeries since they were here last.  Denies any issues with chest pain, chest pressure, shortness of breath, constipation, diarrhea, blood in stool, urinary urgency, frequency, blood in urine, muscle weakness in arms and legs, numbness or tingling in fingers or toes.    Review of Systems  Review of Systems negative except as noted in HPI and Chief complaint.    Current Outpatient Medications:     ascorbic acid (Vitamin C) 500 mg tablet, Take 1 tablet (500 mg) by mouth once daily., Disp: , Rfl:     aspirin (Lo-Dose Aspirin) 81 mg EC tablet, Take 1 tablet (81 mg) by mouth once daily., Disp: , Rfl:     atenolol (Tenormin) 25 mg tablet, Take 1 tablet (25 mg) by mouth once daily., Disp: , Rfl:     atorvastatin (Lipitor) 20 mg tablet, TAKE 1 TABLET BY MOUTH ONCE  DAILY, Disp: 90 tablet, Rfl: 1    cholecalciferol (Vitamin D3) 25 MCG (1000 UT) capsule, Take 1 capsule (25 mcg) by mouth once daily., Disp: , Rfl:     cyanocobalamin (Vitamin B-12) 1,000 mcg tablet, Take 1 tablet (1,000 mcg) by mouth once daily., Disp: , Rfl:     escitalopram (Lexapro) 20 mg tablet, Take 1 tablet (20 mg) by mouth once daily., Disp: 90 tablet, Rfl: 1    eszopiclone (Lunesta) 2 mg tablet, Take 1 tablet (2 mg) by mouth once daily at bedtime. As needed, Disp: 30 tablet, Rfl: 0    fluticasone (Flonase) 50 mcg/actuation nasal spray, Administer 1 spray into each nostril once daily as needed for rhinitis. Shake gently. Before first use, prime pump. After use, clean tip and replace cap., Disp: , Rfl:     gabapentin (Neurontin) 100 mg capsule, Take 1 capsule (100 mg) by mouth once daily at bedtime., Disp: , Rfl:     hydroxyurea (Hydrea) 500 mg capsule, Take 1 capsule (500 mg total) by mouth once daily., Disp: 90 capsule, Rfl: 3    ibuprofen (Advil Liqui-GeL) capsule, Take 2  "capsules (400 mg) by mouth 3 times a day as needed (mild pain)., Disp: , Rfl:     Objective   /76 (BP Location: Left arm, Patient Position: Sitting, BP Cuff Size: Adult)   Pulse 67   Resp 16   Ht 1.499 m (4' 11\")   Wt 58.2 kg (128 lb 3.2 oz)   SpO2 96%   BMI 25.89 kg/m²     Physical Exam  Vitals reviewed.   Cardiovascular:      Rate and Rhythm: Normal rate.   Pulmonary:      Effort: Pulmonary effort is normal.   Musculoskeletal:      Cervical back: Normal range of motion.   Neurological:      General: No focal deficit present.      Mental Status: She is alert.   Psychiatric:         Mood and Affect: Mood normal.         Assessment/Plan   Diagnoses and all orders for this visit:  Mixed hyperlipidemia    HYPERLIPIDEMIA:  Your cholesterol level is elevated. Decrease intake of saturated fats, fast food, sweets.  Increase intake of fresh fruit fresh vegetables and lean meats.  Increase healthy fats seeds, nuts, olive oil instead of butter.  walk 150 minutes/week for heart health.    This note was dictated using DRAGON speech recognition software and was corrected for spelling or grammatical errors, but despite proofreading several typographical errors might be present that might affect the meaning of the content.  Mimi Marlow CNP  Advanced care planning was discussed with Victoria Trotter today. We reviewed code status, Medical Power of , and Living will. Pt has LW or POA.   Patient was identified as a fall risk. Risk prevention instructions provided.  "

## 2024-12-18 NOTE — PATIENT INSTRUCTIONS

## 2024-12-23 DIAGNOSIS — F32.9 REACTIVE DEPRESSION: ICD-10-CM

## 2024-12-24 RX ORDER — ESCITALOPRAM OXALATE 20 MG/1
20 TABLET ORAL DAILY
Qty: 90 TABLET | Refills: 1 | Status: SHIPPED | OUTPATIENT
Start: 2024-12-24

## 2025-01-07 ENCOUNTER — HOSPITAL ENCOUNTER (OUTPATIENT)
Dept: CARDIOLOGY | Facility: HOSPITAL | Age: 75
Discharge: HOME | End: 2025-01-07
Payer: MEDICARE

## 2025-01-07 DIAGNOSIS — I35.9 AORTIC VALVE DISORDER: ICD-10-CM

## 2025-01-07 DIAGNOSIS — I35.0 NONRHEUMATIC AORTIC VALVE STENOSIS: ICD-10-CM

## 2025-01-07 LAB
AORTIC VALVE MEAN GRADIENT: 25 MMHG
AORTIC VALVE PEAK VELOCITY: 3.18 M/S
AV PEAK GRADIENT: 40 MMHG
AVA (PEAK VEL): 0.83 CM2
AVA (VTI): 0.71 CM2
EJECTION FRACTION APICAL 4 CHAMBER: 56.6
EJECTION FRACTION: 63 %
GLOBAL LONGITUDINAL STRAIN: 14 %
LEFT VENTRICLE INTERNAL DIMENSION DIASTOLE: 3.95 CM (ref 3.5–6)
LEFT VENTRICULAR OUTFLOW TRACT DIAMETER: 2 CM
LV EJECTION FRACTION BIPLANE: 57 %
MITRAL VALVE E/A RATIO: 0.66
RIGHT VENTRICLE PEAK SYSTOLIC PRESSURE: 24.5 MMHG
TRICUSPID ANNULAR PLANE SYSTOLIC EXCURSION: 1.9 CM

## 2025-01-07 PROCEDURE — 93356 MYOCRD STRAIN IMG SPCKL TRCK: CPT

## 2025-01-07 PROCEDURE — 93306 TTE W/DOPPLER COMPLETE: CPT | Performed by: INTERNAL MEDICINE

## 2025-01-07 PROCEDURE — 93356 MYOCRD STRAIN IMG SPCKL TRCK: CPT | Performed by: INTERNAL MEDICINE

## 2025-01-12 NOTE — PROGRESS NOTES
Subjective      Chief Complaint   Patient presents with    6 month follow up/echo result           An echocardiogram was done on 1/3/2024 showed that the aortic valve had moderate stenosis with a peak gradient of 37 mmHg with mean gradient of 25 mmHg. n 2022 the gradients were 37 mmHg and 25 mmHg respectively.  The left ventricular function is normal.  An echocardiogram was done January 2025 showing left ventricular function was normal with moderate LVH.  Aortic valve gradients peak was 40 with a mean of 25.  Shehas recovered from the pelvis fracture and is doing will.  She is not complaining of chest discomfort.  No complaints of PND or orthopnea.  The legs are not swelling on her.  NO palpitaions.  The BP is doing well  The chol is up and the statin was increased           Review of Systems   Constitutional: Negative. Negative for chills and fever.   HENT: Negative.     Eyes: Negative.    Respiratory: Negative.  Negative for cough.    Endocrine: Negative.    Skin: Negative.    Musculoskeletal: Negative.  Negative for falls.   Gastrointestinal: Negative.    Genitourinary: Negative.    Neurological: Negative.    All other systems reviewed and are negative.       History reviewed. No pertinent surgical history.     Active Ambulatory Problems     Diagnosis Date Noted    Medical home patient 08/31/2023    Accidental fall 08/31/2023    Age-related osteoporosis with current pathological fracture 08/31/2023    Aortic valve calcification 08/31/2023    Aortic valve disorder 08/31/2023    Closed fracture of surgical neck of humerus 08/31/2023    Disorder of bone, unspecified 08/31/2023    Displaced apophyseal fracture of left femur, subsequent encounter for closed fracture with routine healing 08/31/2023    Injury of wrist 08/31/2023    Menopausal syndrome 08/31/2023    Mixed hyperlipidemia 08/31/2023    Multiple closed fractures of ribs of right side 08/31/2023    Murmur 08/31/2023    Osteopenia of left thigh 08/31/2023     Primary insomnia 08/31/2023    Primary osteoarthritis of right knee 08/31/2023    Strain of thoracic back region 08/31/2023    Tinnitus of left ear 08/31/2023     Resolved Ambulatory Problems     Diagnosis Date Noted    Essential hypertension 08/31/2023    Myeloproliferative disorder (Multi) 08/31/2023    Reactive depression 08/31/2023    Vitamin B 12 deficiency 08/31/2023    Vitamin D deficiency 08/31/2023    Fracture of right inferior pubic ramus, closed, initial encounter 06/19/2024    Ambulatory dysfunction 06/20/2024     Past Medical History:   Diagnosis Date    High cholesterol     Hypertension         Visit Vitals  /79   Pulse 77   Wt 58.1 kg (128 lb)   SpO2 96%   BMI 25.85 kg/m²   OB Status Postmenopausal   Smoking Status Former   BSA 1.56 m²        Objective     Constitutional:       Appearance: Healthy appearance.   Neck:      Vascular: No JVR.   Pulmonary:      Effort: Pulmonary effort is normal.      Breath sounds: Normal breath sounds.   Cardiovascular:      PMI at left midclavicular line. Normal rate. Regular rhythm. Normal S1. Normal S2.       Murmurs: There is a grade 2to 3/6 harsh midsystolic murmur at the URSB, radiating to the neck.      No gallop.  No click. No rub.   Pulses:     Intact distal pulses.   Abdominal:      Palpations: Abdomen is soft.   Musculoskeletal: Normal range of motion. Skin:     General: Skin is warm and dry.   Neurological:      General: No focal deficit present.            Lab Review:         Lab Results   Component Value Date    CHOL 214 (H) 06/18/2024    CHOL 188 06/13/2023    CHOL 218 (H) 06/07/2022     Lab Results   Component Value Date    HDL 66.0 06/18/2024    HDL 67 06/13/2023    HDL 68 06/07/2022     Lab Results   Component Value Date    LDLCALC 112 06/18/2024    LDLCALC 93 06/13/2023    LDLCALC 116 06/07/2022     Lab Results   Component Value Date    TRIG 179 (H) 06/18/2024    TRIG 142 06/13/2023    TRIG 170 (H) 06/07/2022     No components found for:  "\"CHOLHDL\"     Assessment/Plan     Aortic valve disorder  She is doing well and is active.  No angina and no chf. The echo shows the aortic valve has not changed in 2 years.  Will see in 6 month and repeat the echo before.    Mixed hyperlipidemia  The chol is uip and the meds were adjusted last time     "

## 2025-01-13 ENCOUNTER — OFFICE VISIT (OUTPATIENT)
Dept: CARDIOLOGY | Facility: CLINIC | Age: 75
End: 2025-01-13
Payer: MEDICARE

## 2025-01-13 VITALS
BODY MASS INDEX: 25.85 KG/M2 | DIASTOLIC BLOOD PRESSURE: 79 MMHG | HEART RATE: 77 BPM | OXYGEN SATURATION: 96 % | SYSTOLIC BLOOD PRESSURE: 124 MMHG | WEIGHT: 128 LBS

## 2025-01-13 DIAGNOSIS — I35.0 AORTIC VALVE STENOSIS, ETIOLOGY OF CARDIAC VALVE DISEASE UNSPECIFIED: ICD-10-CM

## 2025-01-13 DIAGNOSIS — I35.9 AORTIC VALVE DISORDER: Primary | ICD-10-CM

## 2025-01-13 DIAGNOSIS — E78.2 MIXED HYPERLIPIDEMIA: ICD-10-CM

## 2025-01-13 PROCEDURE — 1036F TOBACCO NON-USER: CPT | Performed by: INTERNAL MEDICINE

## 2025-01-13 PROCEDURE — 99213 OFFICE O/P EST LOW 20 MIN: CPT | Performed by: INTERNAL MEDICINE

## 2025-01-13 PROCEDURE — 1157F ADVNC CARE PLAN IN RCRD: CPT | Performed by: INTERNAL MEDICINE

## 2025-01-13 PROCEDURE — 1159F MED LIST DOCD IN RCRD: CPT | Performed by: INTERNAL MEDICINE

## 2025-01-13 PROCEDURE — 1126F AMNT PAIN NOTED NONE PRSNT: CPT | Performed by: INTERNAL MEDICINE

## 2025-01-13 ASSESSMENT — ENCOUNTER SYMPTOMS
COUGH: 0
OCCASIONAL FEELINGS OF UNSTEADINESS: 0
FALLS: 0
GASTROINTESTINAL NEGATIVE: 1
RESPIRATORY NEGATIVE: 1
DEPRESSION: 0
CHILLS: 0
MUSCULOSKELETAL NEGATIVE: 1
LOSS OF SENSATION IN FEET: 0
EYES NEGATIVE: 1
NEUROLOGICAL NEGATIVE: 1
CONSTITUTIONAL NEGATIVE: 1
ENDOCRINE NEGATIVE: 1
FEVER: 0

## 2025-01-13 ASSESSMENT — LIFESTYLE VARIABLES: TOTAL SCORE: 0

## 2025-01-13 ASSESSMENT — PAIN SCALES - GENERAL: PAINLEVEL_OUTOF10: 0-NO PAIN

## 2025-01-13 NOTE — ASSESSMENT & PLAN NOTE
She is doing well and is active.  No angina and no chf. The echo shows the aortic valve has not changed in 2 years.  Will see in 6 month and repeat the echo before.

## 2025-01-31 DIAGNOSIS — E78.2 MIXED HYPERLIPIDEMIA: ICD-10-CM

## 2025-01-31 DIAGNOSIS — F51.01 PRIMARY INSOMNIA: ICD-10-CM

## 2025-01-31 RX ORDER — ESZOPICLONE 2 MG/1
2 TABLET, FILM COATED ORAL NIGHTLY
Qty: 30 TABLET | Refills: 0 | Status: SHIPPED | OUTPATIENT
Start: 2025-01-31

## 2025-02-03 RX ORDER — ATORVASTATIN CALCIUM 20 MG/1
20 TABLET, FILM COATED ORAL DAILY
Qty: 100 TABLET | Refills: 1 | Status: SHIPPED | OUTPATIENT
Start: 2025-02-03

## 2025-03-28 DIAGNOSIS — I10 PRIMARY HYPERTENSION: ICD-10-CM

## 2025-03-28 RX ORDER — ATENOLOL 25 MG/1
25 TABLET ORAL DAILY
Qty: 90 TABLET | Refills: 1 | Status: SHIPPED | OUTPATIENT
Start: 2025-03-28

## 2025-05-13 DIAGNOSIS — F32.9 REACTIVE DEPRESSION: ICD-10-CM

## 2025-05-13 RX ORDER — ESCITALOPRAM OXALATE 20 MG/1
20 TABLET ORAL DAILY
Qty: 90 TABLET | Refills: 1 | Status: SHIPPED | OUTPATIENT
Start: 2025-05-13

## 2025-05-18 DIAGNOSIS — F51.01 PRIMARY INSOMNIA: ICD-10-CM

## 2025-05-18 DIAGNOSIS — Z51.81 ENCOUNTER FOR THERAPEUTIC DRUG LEVEL MONITORING: ICD-10-CM

## 2025-05-18 DIAGNOSIS — Z02.89 MEDICATION MANAGEMENT CONTRACT AGREEMENT: Primary | ICD-10-CM

## 2025-05-19 RX ORDER — ESZOPICLONE 2 MG/1
TABLET, FILM COATED ORAL
Qty: 30 TABLET | OUTPATIENT
Start: 2025-05-19

## 2025-05-20 DIAGNOSIS — F51.01 PRIMARY INSOMNIA: ICD-10-CM

## 2025-05-20 LAB
AMPHETAMINES UR QL: NEGATIVE NG/ML
BARBITURATES UR QL: NEGATIVE NG/ML
BENZODIAZ UR QL: NEGATIVE NG/ML
BZE UR QL: NEGATIVE NG/ML
CREAT UR-MCNC: 42.6 MG/DL
FENTANYL UR QL SCN: NEGATIVE NG/ML
METHADONE UR QL: NEGATIVE NG/ML
OPIATES UR QL: NEGATIVE NG/ML
OXIDANTS UR QL: NEGATIVE MCG/ML
OXYCODONE UR QL: NEGATIVE NG/ML
PCP UR QL: NEGATIVE NG/ML
PH UR: 5.5 [PH] (ref 4.5–9)
QUEST NOTES AND COMMENTS: NORMAL
THC UR QL: NEGATIVE NG/ML

## 2025-05-20 RX ORDER — ESZOPICLONE 2 MG/1
2 TABLET, FILM COATED ORAL NIGHTLY
Qty: 30 TABLET | Refills: 0 | Status: SHIPPED | OUTPATIENT
Start: 2025-05-20

## 2025-06-01 DIAGNOSIS — I10 PRIMARY HYPERTENSION: ICD-10-CM

## 2025-06-02 RX ORDER — ATENOLOL 25 MG/1
25 TABLET ORAL DAILY
Qty: 90 TABLET | Refills: 1 | Status: SHIPPED | OUTPATIENT
Start: 2025-06-02

## 2025-06-10 ENCOUNTER — LAB (OUTPATIENT)
Dept: LAB | Facility: HOSPITAL | Age: 75
End: 2025-06-10
Payer: MEDICARE

## 2025-06-10 DIAGNOSIS — E53.8 DEFICIENCY OF OTHER SPECIFIED B GROUP VITAMINS: Primary | ICD-10-CM

## 2025-06-10 DIAGNOSIS — D47.3 ESSENTIAL THROMBOCYTOSIS: ICD-10-CM

## 2025-06-10 DIAGNOSIS — E53.8 B12 DEFICIENCY: ICD-10-CM

## 2025-06-10 DIAGNOSIS — D47.3 ESSENTIAL (HEMORRHAGIC) THROMBOCYTHEMIA: ICD-10-CM

## 2025-06-10 LAB
ALBUMIN SERPL BCP-MCNC: 4.4 G/DL (ref 3.4–5)
ALP SERPL-CCNC: 48 U/L (ref 33–136)
ALT SERPL W P-5'-P-CCNC: 9 U/L (ref 7–45)
ANION GAP SERPL CALCULATED.3IONS-SCNC: 12 MMOL/L (ref 10–20)
AST SERPL W P-5'-P-CCNC: 14 U/L (ref 9–39)
BASO STIPL BLD QL SMEAR: PRESENT
BASOPHILS # BLD MANUAL: 0.15 X10*3/UL (ref 0–0.1)
BASOPHILS NFR BLD MANUAL: 3 %
BILIRUB SERPL-MCNC: 0.5 MG/DL (ref 0–1.2)
BUN SERPL-MCNC: 20 MG/DL (ref 6–23)
CALCIUM SERPL-MCNC: 9.5 MG/DL (ref 8.6–10.3)
CHLORIDE SERPL-SCNC: 103 MMOL/L (ref 98–107)
CO2 SERPL-SCNC: 28 MMOL/L (ref 21–32)
CREAT SERPL-MCNC: 0.75 MG/DL (ref 0.5–1.05)
DACRYOCYTES BLD QL SMEAR: ABNORMAL
EGFRCR SERPLBLD CKD-EPI 2021: 83 ML/MIN/1.73M*2
EOSINOPHIL # BLD MANUAL: 0.05 X10*3/UL (ref 0–0.4)
EOSINOPHIL NFR BLD MANUAL: 1 %
ERYTHROCYTE [DISTWIDTH] IN BLOOD BY AUTOMATED COUNT: 15.8 % (ref 11.5–14.5)
GLUCOSE SERPL-MCNC: 79 MG/DL (ref 74–99)
HCT VFR BLD AUTO: 36.7 % (ref 36–46)
HGB BLD-MCNC: 11.9 G/DL (ref 12–16)
IMM GRANULOCYTES # BLD AUTO: 0.46 X10*3/UL (ref 0–0.5)
IMM GRANULOCYTES NFR BLD AUTO: 9.2 % (ref 0–0.9)
LDH SERPL L TO P-CCNC: 183 U/L (ref 84–246)
LYMPHOCYTES # BLD MANUAL: 1.25 X10*3/UL (ref 0.8–3)
LYMPHOCYTES NFR BLD MANUAL: 25 %
MCH RBC QN AUTO: 35.4 PG (ref 26–34)
MCHC RBC AUTO-ENTMCNC: 32.4 G/DL (ref 32–36)
MCV RBC AUTO: 109 FL (ref 80–100)
METAMYELOCYTES # BLD MANUAL: 0.05 X10*3/UL
METAMYELOCYTES NFR BLD MANUAL: 1 %
MONOCYTES # BLD MANUAL: 0.5 X10*3/UL (ref 0.05–0.8)
MONOCYTES NFR BLD MANUAL: 10 %
MYELOCYTES # BLD MANUAL: 0.05 X10*3/UL
MYELOCYTES NFR BLD MANUAL: 1 %
NEUTS SEG # BLD MANUAL: 2.95 X10*3/UL (ref 1.6–5)
NEUTS SEG NFR BLD MANUAL: 59 %
NRBC BLD-RTO: 0 /100 WBCS (ref 0–0)
OVALOCYTES BLD QL SMEAR: ABNORMAL
PLATELET # BLD AUTO: 399 X10*3/UL (ref 150–450)
POLYCHROMASIA BLD QL SMEAR: ABNORMAL
POTASSIUM SERPL-SCNC: 5.2 MMOL/L (ref 3.5–5.3)
PROT SERPL-MCNC: 6.4 G/DL (ref 6.4–8.2)
RBC # BLD AUTO: 3.36 X10*6/UL (ref 4–5.2)
RBC MORPH BLD: ABNORMAL
SODIUM SERPL-SCNC: 138 MMOL/L (ref 136–145)
TOTAL CELLS COUNTED BLD: 100
VIT B12 SERPL-MCNC: >2000 PG/ML (ref 211–911)
WBC # BLD AUTO: 5 X10*3/UL (ref 4.4–11.3)

## 2025-06-10 PROCEDURE — 36415 COLL VENOUS BLD VENIPUNCTURE: CPT

## 2025-06-10 PROCEDURE — 80053 COMPREHEN METABOLIC PANEL: CPT

## 2025-06-10 PROCEDURE — 82607 VITAMIN B-12: CPT

## 2025-06-10 PROCEDURE — 85027 COMPLETE CBC AUTOMATED: CPT

## 2025-06-10 PROCEDURE — 83615 LACTATE (LD) (LDH) ENZYME: CPT

## 2025-06-10 PROCEDURE — 85007 BL SMEAR W/DIFF WBC COUNT: CPT

## 2025-06-16 ENCOUNTER — OFFICE VISIT (OUTPATIENT)
Dept: HEMATOLOGY/ONCOLOGY | Facility: CLINIC | Age: 75
End: 2025-06-16
Payer: MEDICARE

## 2025-06-16 VITALS
OXYGEN SATURATION: 95 % | RESPIRATION RATE: 18 BRPM | HEART RATE: 82 BPM | SYSTOLIC BLOOD PRESSURE: 113 MMHG | DIASTOLIC BLOOD PRESSURE: 72 MMHG | WEIGHT: 128.64 LBS | TEMPERATURE: 98.1 F | BODY MASS INDEX: 25.98 KG/M2

## 2025-06-16 DIAGNOSIS — D47.3 ESSENTIAL THROMBOCYTOSIS: Primary | ICD-10-CM

## 2025-06-16 DIAGNOSIS — E53.8 B12 DEFICIENCY: ICD-10-CM

## 2025-06-16 DIAGNOSIS — D47.3 ESSENTIAL THROMBOCYTHEMIA (MULTI): Primary | ICD-10-CM

## 2025-06-16 PROCEDURE — 1125F AMNT PAIN NOTED PAIN PRSNT: CPT | Performed by: NURSE PRACTITIONER

## 2025-06-16 PROCEDURE — 99214 OFFICE O/P EST MOD 30 MIN: CPT | Performed by: NURSE PRACTITIONER

## 2025-06-16 RX ORDER — HYDROXYUREA 500 MG/1
500 CAPSULE ORAL DAILY
Qty: 90 CAPSULE | Refills: 3 | Status: SHIPPED | OUTPATIENT
Start: 2025-06-16 | End: 2026-06-16

## 2025-06-16 ASSESSMENT — ENCOUNTER SYMPTOMS
NEUROLOGICAL NEGATIVE: 1
DIAPHORESIS: 0
EYES NEGATIVE: 1
FATIGUE: 1
HEMATOLOGIC/LYMPHATIC NEGATIVE: 1
RESPIRATORY NEGATIVE: 1
ARTHRALGIAS: 1
PALPITATIONS: 0
FEVER: 0
UNEXPECTED WEIGHT CHANGE: 0
CARDIOVASCULAR NEGATIVE: 1
GASTROINTESTINAL NEGATIVE: 1

## 2025-06-16 ASSESSMENT — PAIN SCALES - GENERAL: PAINLEVEL_OUTOF10: 3

## 2025-06-16 NOTE — PROGRESS NOTES
Patient ID: Victoria Trotter is a 75 y.o. female.  Referring Physician: Erna Figueroa PA-C  [address] [place] [address]  [place] [address]  Primary Care Provider: PERLA Torres  Visit Type: Follow Up      Subjective    HPI  Patient returns today for routine follow up evaluation for history of essential thrombocytosis diagnosed by Vipul Ray M.D., bone marrow biopsy in 2004, JAK2 gene mutation negative, currently well controlled with hydroxyurea.   Macrocytosis with history of vitamin B12 deficiency. Remains on oral vitamin B12.   Patient remains on hydroxyurea 500 mg daily.  This is overall kept her counts stable.  She has not had any fever, chills or night sweats.  No cough, chest pain or shortness of breath.  No nausea or vomiting.  No constipation or diarrhea. No VTE. No signs or symptoms of active bleeding or unusual bruising.  She has upcoming knee replacement in September    Review of Systems   Constitutional:  Positive for fatigue. Negative for diaphoresis, fever and unexpected weight change.   HENT:  Negative.     Eyes: Negative.    Respiratory: Negative.     Cardiovascular: Negative.  Negative for palpitations.   Gastrointestinal: Negative.    Musculoskeletal:  Positive for arthralgias.        Knee pain, upcoming TKA   Skin: Negative.    Neurological: Negative.    Hematological: Negative.       Objective   BSA: 1.56 meters squared  /72 (BP Location: Right arm, Patient Position: Sitting, BP Cuff Size: Adult long)   Pulse 82   Temp 36.7 °C (98.1 °F) (Temporal)   Resp 18   Wt 58.3 kg (128 lb 10.2 oz)   SpO2 95%   BMI 25.98 kg/m²      has a past medical history of High cholesterol and Hypertension.   has no past surgical history on file.  Family History[1]      Victoria Trotter  reports that she quit smoking about 49 years ago. Her smoking use included cigarettes. She started smoking about 59 years ago. She has a 2.5 pack-year smoking history. She has been exposed to tobacco smoke. She  has never used smokeless tobacco.  She  reports current alcohol use of about 2.0 standard drinks of alcohol per week.  She  reports no history of drug use.    Physical Exam  Constitutional:       Appearance: Normal appearance.   Eyes:      Conjunctiva/sclera: Conjunctivae normal.   Cardiovascular:      Rate and Rhythm: Normal rate and regular rhythm.      Pulses: Normal pulses.      Heart sounds: Normal heart sounds. No murmur heard.     Comments: Murmur not appreciated  Pulmonary:      Effort: Pulmonary effort is normal. No respiratory distress.      Breath sounds: Normal breath sounds. No stridor. No wheezing, rhonchi or rales.   Abdominal:      General: There is no distension.      Palpations: Abdomen is soft. There is no mass.      Tenderness: There is no abdominal tenderness.   Musculoskeletal:         General: No swelling or tenderness. Normal range of motion.   Lymphadenopathy:      Cervical: No cervical adenopathy.   Skin:     Coloration: Skin is not jaundiced or pale.      Findings: No bruising.   Neurological:      Motor: No weakness.     WBC   Date/Time Value Ref Range Status   06/10/2025 09:36 AM 5.0 4.4 - 11.3 x10*3/uL Final   12/14/2024 10:42 AM 3.8 (L) 4.4 - 11.3 x10*3/uL Final   08/22/2024 09:53 AM 7.1 4.4 - 11.3 x10*3/uL Final     nRBC   Date Value Ref Range Status   06/10/2025 0.0 0.0 - 0.0 /100 WBCs Final   12/14/2024 0.0 0.0 - 0.0 /100 WBCs Final   08/22/2024 0.0 0.0 - 0.0 /100 WBCs Final     RBC   Date Value Ref Range Status   06/10/2025 3.36 (L) 4.00 - 5.20 x10*6/uL Final   12/14/2024 3.42 (L) 4.00 - 5.20 x10*6/uL Final   08/22/2024 3.40 (L) 4.00 - 5.20 x10*6/uL Final     Hemoglobin   Date Value Ref Range Status   06/10/2025 11.9 (L) 12.0 - 16.0 g/dL Final   12/14/2024 12.2 12.0 - 16.0 g/dL Final   08/22/2024 12.0 12.0 - 16.0 g/dL Final     Hematocrit   Date Value Ref Range Status   06/10/2025 36.7 36.0 - 46.0 % Final   12/14/2024 37.0 36.0 - 46.0 % Final   08/22/2024 37.4 36.0 - 46.0 % Final      MCV   Date/Time Value Ref Range Status   06/10/2025 09:36  (H) 80 - 100 fL Final   12/14/2024 10:42  (H) 80 - 100 fL Final   08/22/2024 09:53  (H) 80 - 100 fL Final     MCH   Date/Time Value Ref Range Status   06/10/2025 09:36 AM 35.4 (H) 26.0 - 34.0 pg Final   12/14/2024 10:42 AM 35.7 (H) 26.0 - 34.0 pg Final   08/22/2024 09:53 AM 35.3 (H) 26.0 - 34.0 pg Final     MCHC   Date/Time Value Ref Range Status   06/10/2025 09:36 AM 32.4 32.0 - 36.0 g/dL Final   12/14/2024 10:42 AM 33.0 32.0 - 36.0 g/dL Final   08/22/2024 09:53 AM 32.1 32.0 - 36.0 g/dL Final     RDW   Date/Time Value Ref Range Status   06/10/2025 09:36 AM 15.8 (H) 11.5 - 14.5 % Final   12/14/2024 10:42 AM 15.0 (H) 11.5 - 14.5 % Final   08/22/2024 09:53 AM 14.6 (H) 11.5 - 14.5 % Final     Platelets   Date/Time Value Ref Range Status   06/10/2025 09:36  150 - 450 x10*3/uL Final   12/14/2024 10:42  150 - 450 x10*3/uL Final   08/22/2024 09:53  (H) 150 - 450 x10*3/uL Final     MPV   Date/Time Value Ref Range Status   06/13/2023 09:27 AM 9.9 7.0 - 12.6 CU Final   05/04/2022 09:23 AM 9.8 7.0 - 12.6 CU Final   11/29/2021 10:44 AM 9.4 7.0 - 12.6 CU Final     Neutrophils %   Date/Time Value Ref Range Status   11/13/2023 08:42 AM 52.4 40.0 - 80.0 % Final   05/10/2023 08:59 AM 53.3 40.0 - 80.0 % Final   11/14/2022 08:59 AM 57.0 40.0 - 80.0 % Final     Immature Granulocytes %, Automated   Date/Time Value Ref Range Status   06/10/2025 09:36 AM 9.2 (H) 0.0 - 0.9 % Final     Comment:     Immature Granulocyte Count (IG) includes promyelocytes, myelocytes and metamyelocytes but does not include bands. Percent differential counts (%) should be interpreted in the context of the absolute cell counts (cells/UL).   12/14/2024 10:42 AM 8.9 (H) 0.0 - 0.9 % Final     Comment:     Immature Granulocyte Count (IG) includes promyelocytes, myelocytes and metamyelocytes but does not include bands. Percent differential counts (%) should be  interpreted in the context of the absolute cell counts (cells/UL).   08/22/2024 09:53 AM 9.6 (H) 0.0 - 0.9 % Final     Comment:     Immature Granulocyte Count (IG) includes promyelocytes, myelocytes and metamyelocytes but does not include bands. Percent differential counts (%) should be interpreted in the context of the absolute cell counts (cells/UL).     Lymphocytes %, Manual   Date/Time Value Ref Range Status   06/10/2025 09:36 AM 25.0 13.0 - 44.0 % Final   12/14/2024 10:42 AM 28.0 13.0 - 44.0 % Final   08/22/2024 09:53 AM 15.0 13.0 - 44.0 % Final     Monocytes %, Manual   Date/Time Value Ref Range Status   06/10/2025 09:36 AM 10.0 2.0 - 10.0 % Final   12/14/2024 10:42 AM 5.0 2.0 - 10.0 % Final   08/22/2024 09:53 AM 6.0 2.0 - 10.0 % Final     Eosinophils %, Manual   Date/Time Value Ref Range Status   06/10/2025 09:36 AM 1.0 0.0 - 6.0 % Final   12/14/2024 10:42 AM 1.0 0.0 - 6.0 % Final   08/22/2024 09:53 AM 0.0 0.0 - 6.0 % Final     Basophils %, Manual   Date/Time Value Ref Range Status   06/10/2025 09:36 AM 3.0 0.0 - 2.0 % Final   12/14/2024 10:42 AM 2.0 0.0 - 2.0 % Final   08/22/2024 09:53 AM 0.0 0.0 - 2.0 % Final     Neutrophils Absolute   Date/Time Value Ref Range Status   11/13/2023 08:42 AM 1.87 1.60 - 5.50 x10*3/uL Final     Comment:     Percent differential counts (%) should be interpreted in the context of the absolute cell counts (cells/uL).   06/13/2023 09:27 AM 3.61 1.8 - 7.7 K/UL Final   05/10/2023 08:59 AM 2.48 1.60 - 5.50 x10E9/L Final   11/14/2022 08:59 AM 2.64 1.60 - 5.50 x10E9/L Final     Immature Granulocytes Absolute, Automated   Date/Time Value Ref Range Status   06/10/2025 09:36 AM 0.46 0.00 - 0.50 x10*3/uL Final   12/14/2024 10:42 AM 0.34 0.00 - 0.50 x10*3/uL Final   08/22/2024 09:53 AM 0.68 (H) 0.00 - 0.50 x10*3/uL Final     Lymphocytes Absolute   Date/Time Value Ref Range Status   11/13/2023 08:42 AM 1.18 0.80 - 3.00 x10*3/uL Final   06/13/2023 09:27 AM 1.18 (L) 1.2 - 3.2 K/UL Final    05/10/2023 08:59 AM 1.30 0.80 - 3.00 x10E9/L Final   11/14/2022 08:59 AM 1.25 0.80 - 3.00 x10E9/L Final     Monocytes Absolute   Date/Time Value Ref Range Status   11/13/2023 08:42 AM 0.29 0.05 - 0.80 x10*3/uL Final   06/13/2023 09:27 AM 0.71 0 - 0.8 K/UL Final   05/10/2023 08:59 AM 0.39 0.05 - 0.80 x10E9/L Final   11/14/2022 08:59 AM 0.29 0.05 - 0.80 x10E9/L Final     Eosinophils Absolute, Manual   Date/Time Value Ref Range Status   06/10/2025 09:36 AM 0.05 0.00 - 0.40 x10*3/uL Final   12/14/2024 10:42 AM 0.04 0.00 - 0.40 x10*3/uL Final   08/22/2024 09:53 AM 0.00 0.00 - 0.40 x10*3/uL Final     Basophils Absolute, Manual   Date/Time Value Ref Range Status   06/10/2025 09:36 AM 0.15 (H) 0.00 - 0.10 x10*3/uL Final   12/14/2024 10:42 AM 0.08 0.00 - 0.10 x10*3/uL Final   08/22/2024 09:53 AM 0.00 0.00 - 0.10 x10*3/uL Final       Assessment/Plan    Myeloproliferative disorder (essential thrombocytosis) :  Patient will continue hydroxyurea 500 mg daily and aspirin 81 mg daily  Refill sent      Vitamin B12 deficiency:   She will continue to take oral B12, but decrease to MWF due to higher B12 levels     Immune prophylaxis:   The patient will continue with the flu shot every fall and the Pneumovax every five years.  She is planning to get her shingles vaccine. She has gotten her COVID booster.      Plan:  - 6 month follow-up visit  - - labs  6 months (CBCd, CMP and LDH)     Diagnoses and all orders for this visit:  Essential thrombocytosis  -     CBC and Auto Differential; Future  -     Comprehensive Metabolic Panel; Future  -     Lactate Dehydrogenase; Future  -     Clinic Appointment Request Follow up  -     CBC and Auto Differential; Future  -     Comprehensive Metabolic Panel; Future  -     Lactate Dehydrogenase; Future  -     Clinic Appointment Request Follow up; Future  B12 deficiency  -     Vitamin B12; Future  -     Vitamin B12; Future           Erna Figueroa PA-C                                [1]   Family  History  Problem Relation Name Age of Onset    Breast cancer Mother      Throat cancer Father

## 2025-06-19 ENCOUNTER — OFFICE VISIT (OUTPATIENT)
Dept: PRIMARY CARE | Facility: CLINIC | Age: 75
End: 2025-06-19
Payer: MEDICARE

## 2025-06-19 VITALS
HEIGHT: 59 IN | DIASTOLIC BLOOD PRESSURE: 76 MMHG | OXYGEN SATURATION: 96 % | SYSTOLIC BLOOD PRESSURE: 122 MMHG | BODY MASS INDEX: 26.53 KG/M2 | HEART RATE: 73 BPM | WEIGHT: 131.6 LBS | RESPIRATION RATE: 16 BRPM

## 2025-06-19 DIAGNOSIS — Z23 ENCOUNTER FOR IMMUNIZATION: ICD-10-CM

## 2025-06-19 DIAGNOSIS — Z00.00 MEDICARE ANNUAL WELLNESS VISIT, SUBSEQUENT: ICD-10-CM

## 2025-06-19 DIAGNOSIS — E55.9 VITAMIN D DEFICIENCY: ICD-10-CM

## 2025-06-19 DIAGNOSIS — R73.01 ELEVATED FASTING GLUCOSE: ICD-10-CM

## 2025-06-19 DIAGNOSIS — Z13.29 SCREENING FOR THYROID DISORDER: ICD-10-CM

## 2025-06-19 DIAGNOSIS — I10 PRIMARY HYPERTENSION: ICD-10-CM

## 2025-06-19 DIAGNOSIS — F32.9 REACTIVE DEPRESSION: ICD-10-CM

## 2025-06-19 DIAGNOSIS — E78.2 MIXED HYPERLIPIDEMIA: Primary | ICD-10-CM

## 2025-06-19 DIAGNOSIS — F51.01 PRIMARY INSOMNIA: ICD-10-CM

## 2025-06-19 PROCEDURE — 90715 TDAP VACCINE 7 YRS/> IM: CPT

## 2025-06-19 PROCEDURE — 99397 PER PM REEVAL EST PAT 65+ YR: CPT

## 2025-06-19 PROCEDURE — 3078F DIAST BP <80 MM HG: CPT

## 2025-06-19 PROCEDURE — 1159F MED LIST DOCD IN RCRD: CPT

## 2025-06-19 PROCEDURE — 1124F ACP DISCUSS-NO DSCNMKR DOCD: CPT

## 2025-06-19 PROCEDURE — 3074F SYST BP LT 130 MM HG: CPT

## 2025-06-19 PROCEDURE — 1036F TOBACCO NON-USER: CPT

## 2025-06-19 PROCEDURE — 99397 PER PM REEVAL EST PAT 65+ YR: CPT | Mod: 25

## 2025-06-19 PROCEDURE — 1126F AMNT PAIN NOTED NONE PRSNT: CPT

## 2025-06-19 PROCEDURE — 1170F FXNL STATUS ASSESSED: CPT

## 2025-06-19 RX ORDER — ESZOPICLONE 2 MG/1
2 TABLET, FILM COATED ORAL NIGHTLY
Qty: 30 TABLET | Refills: 0 | Status: SHIPPED | OUTPATIENT
Start: 2025-06-19

## 2025-06-19 RX ORDER — ATENOLOL 25 MG/1
25 TABLET ORAL DAILY
Qty: 90 TABLET | Refills: 1 | Status: SHIPPED | OUTPATIENT
Start: 2025-06-19

## 2025-06-19 RX ORDER — ESCITALOPRAM OXALATE 20 MG/1
20 TABLET ORAL DAILY
Qty: 90 TABLET | Refills: 1 | Status: SHIPPED | OUTPATIENT
Start: 2025-06-19

## 2025-06-19 RX ORDER — ATORVASTATIN CALCIUM 20 MG/1
20 TABLET, FILM COATED ORAL DAILY
Qty: 90 TABLET | Refills: 1 | Status: SHIPPED | OUTPATIENT
Start: 2025-06-19

## 2025-06-19 ASSESSMENT — ACTIVITIES OF DAILY LIVING (ADL)
PATIENT'S MEMORY ADEQUATE TO SAFELY COMPLETE DAILY ACTIVITIES?: YES
HEARING - RIGHT EAR: FUNCTIONAL
BATHING: INDEPENDENT
HEARING - LEFT EAR: FUNCTIONAL
DRESSING YOURSELF: INDEPENDENT
GROOMING: INDEPENDENT
USING TELEPHONE: INDEPENDENT
PILL BOX USED: NO
USING TRANSPORTATION: INDEPENDENT
JUDGMENT_ADEQUATE_SAFELY_COMPLETE_DAILY_ACTIVITIES: YES
NEEDS ASSISTANCE WITH FOOD: INDEPENDENT
GROCERY SHOPPING: INDEPENDENT
STIL DRIVING: YES
WALKS IN HOME: INDEPENDENT
TAKING MEDICATION: INDEPENDENT
FEEDING YOURSELF: INDEPENDENT
MANAGING FINANCES: INDEPENDENT
ADEQUATE_TO_COMPLETE_ADL: YES
TOILETING: INDEPENDENT
EATING: INDEPENDENT
PREPARING MEALS: INDEPENDENT
DOING HOUSEWORK: INDEPENDENT

## 2025-06-19 ASSESSMENT — PATIENT HEALTH QUESTIONNAIRE - PHQ9
1. LITTLE INTEREST OR PLEASURE IN DOING THINGS: NOT AT ALL
2. FEELING DOWN, DEPRESSED OR HOPELESS: NOT AT ALL
SUM OF ALL RESPONSES TO PHQ9 QUESTIONS 1 AND 2: 0

## 2025-06-19 ASSESSMENT — PAIN SCALES - GENERAL: PAINLEVEL_OUTOF10: 0-NO PAIN

## 2025-06-19 ASSESSMENT — ENCOUNTER SYMPTOMS
LOSS OF SENSATION IN FEET: 0
DEPRESSION: 0
OCCASIONAL FEELINGS OF UNSTEADINESS: 0

## 2025-06-19 NOTE — PROGRESS NOTES
"Subjective   Reason for Visit: Victoria Trotter is an 75 y.o. female here for a Medicare Wellness visit.     Past Medical, Surgical, and Family History reviewed and updated in chart.    Reviewed all medications by prescribing practitioner or clinical pharmacist (such as prescriptions, OTCs, herbal therapies and supplements) and documented in the medical record.    HPI  Patient denies any falls, urgent care, ER, hospitalization, new diagnoses, surgeries since they were here last.    Denies any issues with chest pain, chest pressure, shortness of breath, constipation, diarrhea, blood in stool, urinary urgency, frequency, blood in urine, muscle weakness in arms and legs, numbness or tingling in fingers or toes.  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 Trammell with [facility] in September      Patient Care Team:  COCO Torres-CNP as PCP - [employer] PCP     Review of Systems  Review of Systems negative except as noted in HPI and Chief complaint.  Current Medications[1]    Objective   Vitals:  /76 (BP Location: Left arm, Patient Position: Sitting, BP Cuff Size: Adult)   Pulse 73   Resp 16   Ht 1.499 m (4' 11\")   Wt 59.7 kg (131 lb 9.6 oz)   SpO2 96%   BMI 26.58 kg/m²       Physical Exam  Vitals reviewed.   Constitutional:       General: She is not in acute distress.     Appearance: Normal appearance.   HENT:      Head: Normocephalic.      Right Ear: Tympanic membrane normal.      Left Ear: Tympanic membrane normal.      Nose: Nose normal.      Mouth/Throat:      Mouth: Mucous membranes are moist.      Pharynx: Oropharynx is clear.   Eyes:      Extraocular Movements: Extraocular movements intact.      Conjunctiva/sclera: Conjunctivae normal.      Pupils: " Pupils are equal, round, and reactive to light.   Cardiovascular:      Rate and Rhythm: Normal rate.      Pulses: Normal pulses.      Heart sounds: Normal heart sounds.   Pulmonary:      Effort: Pulmonary effort is normal.      Breath sounds: Normal breath sounds.   Abdominal:      General: Abdomen is flat. Bowel sounds are normal.      Palpations: Abdomen is soft.   Musculoskeletal:         General: Normal range of motion.   Skin:     General: Skin is warm and dry.      Capillary Refill: Capillary refill takes 2 to 3 seconds.   Neurological:      General: No focal deficit present.      Mental Status: She is alert and oriented to person, place, and time. Mental status is at baseline.      Cranial Nerves: Cranial nerves 2-12 are intact.   Psychiatric:         Mood and Affect: Mood normal.         Behavior: Behavior is cooperative.         Assessment & Plan  Mixed hyperlipidemia    Orders:    Lipid Panel; Future    atorvastatin (Lipitor) 20 mg tablet; Take 1 tablet (20 mg) by mouth once daily.    Decrease intake of saturated fats, fast food, sweets.  Increase intake of fresh fruit fresh vegetables and lean meats.  Increase healthy fats seeds, nuts, olive oil instead of butter.  walk 150 minutes/week for heart health.   Aim for 25-30 grams of fiber in your diet daily.  May consider adding Fish Oil supplement 1,200 mg per day or Omega 3 Supplement daily.      Vitamin D deficiency    Orders:    Vitamin D 25-Hydroxy,Total (for eval of Vitamin D levels); Future    Screening for thyroid disorder    Orders:    TSH with reflex to Free T4 if abnormal; Future    Elevated fasting glucose    Orders:    Hemoglobin A1C; Future    Primary hypertension    HYPERTENSION:   Decrease intake of processed foods, fast foods, lunch meat, canned soups, canned veggies.  Increase intake of fresh fruits, veggies, and lean meats. Monitor blood pressure at home, keep a log and bring this with you to your next appointment. Call the office if your  blood pressure runs 150/90 or higher consistently.  Blood Pressure Technique:  Sit quietly in a chair for 5 minutes with back supported and feet on the floor  Then place left arm on a table or armrest so bicep area is at the same level of heart or left breast  Check three times in a row, disregard the highest reading and average the other two    Reactive depression    -Relaxation techniques- deep breathing, meditation, acupuncture, guided imagery, yoga  -3-3-3 sit down in a quiet room. Look around the room and name 3 items you see. Then close your eyes and listen and quietly name 3 items you hear around you. Lastly, move 3 different body parts in a [employer] 10 times each  -Avoid caffeine, alcohol, illicit drug use  -Get enough sleep, 7-9 hours per night  -Eat a healthy diet, prioritizing lean proteins, fruits/vegetables, and whole grains  -Purposeful movement daily- walking, biking, strength training  -Speaking with a counselor can be very helpful. Consider a referral to behavioral health    Primary insomnia    Make sure to establish a consistent bedtime routine  No TV/electronics 2 hours before bed; reading is more relaxing/calming  No caffeine 2-3 hours before bedtime  Do not watch TV, read or listen to music in bed.    Medicare annual wellness visit, subsequent    LAB Order/ BLOOD TESTS   I have ordered lab work for you to get done. This should be fasting. Nothing to eat or drink after midnight besides black tea,  black coffee, or water. If you do not hear from this office within two days of having your labs done, please call for your results.   Please call to schedule an appointment:   [facility] Scheduling phone number is [phone]  You can also schedule an appointment online by logging into   [employer]    Encounter for immunization    Orders:    Tdap vaccine, age 7 years and older  (BOOSTRIX)    This note was dictated using DRAGON speech recognition software and was corrected for spelling or grammatical  errors, but despite proofreading several typographical errors might be present that might affect the meaning of the content.  Mimi Marlow CNP  Advanced care planning was discussed with Victoria Trotter today. We reviewed code status, Medical Power of , and Living will. Pt has LW or POA.          [1]   Current Outpatient Medications:     ascorbic acid (Vitamin C) 500 mg tablet, Take 1 tablet (500 mg) by mouth once daily., Disp: , Rfl:     aspirin (Lo-Dose Aspirin) 81 mg EC tablet, Take 1 tablet (81 mg) by mouth once daily., Disp: , Rfl:     cholecalciferol (Vitamin D3) 25 MCG (1000 UT) capsule, Take 1 capsule (25 mcg) by mouth once daily., Disp: , Rfl:     cyanocobalamin (Vitamin B-12) 1,000 mcg tablet, Take 1 tablet (1,000 mcg) by mouth once daily., Disp: , Rfl:     fluticasone (Flonase) 50 mcg/actuation nasal spray, Administer 1 spray into each nostril once daily as needed for rhinitis. Shake gently. Before first use, prime pump. After use, clean tip and replace cap., Disp: , Rfl:     gabapentin (Neurontin) 100 mg capsule, Take 1 capsule (100 mg) by mouth once daily at bedtime., Disp: , Rfl:     hydroxyurea (Hydrea) 500 mg capsule, Take 1 capsule (500 mg total) by mouth once daily.  Take at the same time each day., Disp: 90 capsule, Rfl: 3    ibuprofen (Advil Liqui-GeL) capsule, Take 2 capsules (400 mg) by mouth 3 times a day as needed (mild pain)., Disp: , Rfl:     atenolol (Tenormin) 25 mg tablet, Take 1 tablet (25 mg) by mouth once daily., Disp: 90 tablet, Rfl: 1    atorvastatin (Lipitor) 20 mg tablet, Take 1 tablet (20 mg) by mouth once daily., Disp: 90 tablet, Rfl: 1    escitalopram (Lexapro) 20 mg tablet, Take 1 tablet (20 mg) by mouth once daily., Disp: 90 tablet, Rfl: 1    eszopiclone (Lunesta) 2 mg tablet, Take 1 tablet (2 mg) by mouth once daily at bedtime. As needed, Disp: 30 tablet, Rfl: 0

## 2025-06-19 NOTE — ASSESSMENT & PLAN NOTE
Orders:    Lipid Panel; Future    atorvastatin (Lipitor) 20 mg tablet; Take 1 tablet (20 mg) by mouth once daily.    Decrease intake of saturated fats, fast food, sweets.  Increase intake of fresh fruit fresh vegetables and lean meats.  Increase healthy fats seeds, nuts, olive oil instead of butter.  walk 150 minutes/week for heart health.   Aim for 25-30 grams of fiber in your diet daily.  May consider adding Fish Oil supplement 1,200 mg per day or Omega 3 Supplement daily.

## 2025-06-19 NOTE — ASSESSMENT & PLAN NOTE
Make sure to establish a consistent bedtime routine  No TV/electronics 2 hours before bed; reading is more relaxing/calming  No caffeine 2-3 hours before bedtime  Do not watch TV, read or listen to music in bed.

## 2025-06-19 NOTE — ASSESSMENT & PLAN NOTE
LAB Order/ BLOOD TESTS   I have ordered lab work for you to get done. This should be fasting. Nothing to eat or drink after midnight besides black tea,  black coffee, or water. If you do not hear from this office within two days of having your labs done, please call for your results.   Please call to schedule an appointment:   [facility] Scheduling phone number is [phone]  You can also schedule an appointment online by logging into   [email or web address]

## 2025-06-19 NOTE — PATIENT INSTRUCTIONS
Assessment & Plan  Mixed hyperlipidemia    Orders:    Lipid Panel; Future    Decrease intake of saturated fats, fast food, sweets.  Increase intake of fresh fruit fresh vegetables and lean meats.  Increase healthy fats seeds, nuts, olive oil instead of butter.  walk 150 minutes/week for heart health.   Aim for 25-30 grams of fiber in your diet daily.  May consider adding Fish Oil supplement 1,200 mg per day or Omega 3 Supplement daily.      Vitamin D deficiency    Orders:    Vitamin D 25-Hydroxy,Total (for eval of Vitamin D levels); Future    Screening for thyroid disorder    Orders:    TSH with reflex to Free T4 if abnormal; Future    Elevated fasting glucose    Orders:    Hemoglobin A1C; Future    Primary hypertension    HYPERTENSION:   Decrease intake of processed foods, fast foods, lunch meat, canned soups, canned veggies.  Increase intake of fresh fruits, veggies, and lean meats. Monitor blood pressure at home, keep a log and bring this with you to your next appointment. Call the office if your blood pressure runs 150/90 or higher consistently.  Blood Pressure Technique:  Sit quietly in a chair for 5 minutes with back supported and feet on the floor  Then place left arm on a table or armrest so bicep area is at the same level of heart or left breast  Check three times in a row, disregard the highest reading and average the other two    Reactive depression    -Relaxation techniques- deep breathing, meditation, acupuncture, guided imagery, yoga  -3-3-3 sit down in a quiet room. Look around the room and name 3 items you see. Then close your eyes and listen and quietly name 3 items you hear around you. Lastly, move 3 different body parts in a [place] 10 times each  -Avoid caffeine, alcohol, illicit drug use  -Get enough sleep, 7-9 hours per night  -Eat a healthy diet, prioritizing lean proteins, fruits/vegetables, and whole grains  -Purposeful movement daily- walking, biking, strength training  -Speaking with a  counselor can be very helpful. Consider a referral to behavioral health    Primary insomnia    Make sure to establish a consistent bedtime routine  No TV/electronics 2 hours before bed; reading is more relaxing/calming  No caffeine 2-3 hours before bedtime  Do not watch TV, read or listen to music in bed.    Medicare annual wellness visit, subsequent    LAB Order/ BLOOD TESTS   I have ordered lab work for you to get done. This should be fasting. Nothing to eat or drink after midnight besides black tea,  black coffee, or water. If you do not hear from this office within two days of having your labs done, please call for your results.   Please call to schedule an appointment:   [facility] Scheduling phone number is [phone]  You can also schedule an appointment online by logging into   [email or web address]

## 2025-06-20 LAB
25(OH)D3+25(OH)D2 SERPL-MCNC: 34 NG/ML (ref 30–100)
CHOLEST SERPL-MCNC: 186 MG/DL
CHOLEST/HDLC SERPL: 2.7 (CALC)
EST. AVERAGE GLUCOSE BLD GHB EST-MCNC: 117 MG/DL
EST. AVERAGE GLUCOSE BLD GHB EST-SCNC: 6.5 MMOL/L
HBA1C MFR BLD: 5.7 %
HDLC SERPL-MCNC: 68 MG/DL
LDLC SERPL CALC-MCNC: 93 MG/DL (CALC)
NONHDLC SERPL-MCNC: 118 MG/DL (CALC)
TRIGL SERPL-MCNC: 146 MG/DL
TSH SERPL-ACNC: 2.43 MIU/L (ref 0.4–4.5)

## 2025-06-23 ENCOUNTER — TELEPHONE (OUTPATIENT)
Dept: PRIMARY CARE | Facility: CLINIC | Age: 75
End: 2025-06-23
Payer: MEDICARE

## 2025-06-23 DIAGNOSIS — Z12.31 ENCOUNTER FOR SCREENING MAMMOGRAM FOR MALIGNANT NEOPLASM OF BREAST: Primary | ICD-10-CM

## 2025-06-23 NOTE — TELEPHONE ENCOUNTER
Pt was seen in office Thursday 06/19/2025 for her annual, pt did receive orders for blood work but no mammogram . Wants to know if she can have order to have completed

## 2025-06-24 ENCOUNTER — HOSPITAL ENCOUNTER (OUTPATIENT)
Dept: RADIOLOGY | Facility: CLINIC | Age: 75
Discharge: HOME | End: 2025-06-24
Payer: MEDICARE

## 2025-06-24 DIAGNOSIS — R52 PAIN: ICD-10-CM

## 2025-06-24 PROCEDURE — 73521 X-RAY EXAM HIPS BI 2 VIEWS: CPT | Mod: BILATERAL PROCEDURE | Performed by: STUDENT IN AN ORGANIZED HEALTH CARE EDUCATION/TRAINING PROGRAM

## 2025-06-24 PROCEDURE — 73521 X-RAY EXAM HIPS BI 2 VIEWS: CPT

## 2025-07-01 ENCOUNTER — HOSPITAL ENCOUNTER (OUTPATIENT)
Dept: CARDIOLOGY | Facility: HOSPITAL | Age: 75
Discharge: HOME | End: 2025-07-01
Payer: MEDICARE

## 2025-07-01 DIAGNOSIS — I35.0 AORTIC VALVE STENOSIS, ETIOLOGY OF CARDIAC VALVE DISEASE UNSPECIFIED: ICD-10-CM

## 2025-07-01 DIAGNOSIS — I35.9 AORTIC VALVE DISORDER: ICD-10-CM

## 2025-07-01 LAB
AORTIC VALVE MEAN GRADIENT: 25 MMHG
AORTIC VALVE PEAK VELOCITY: 3.16 M/S
AV PEAK GRADIENT: 40 MMHG
AVA (PEAK VEL): 0.95 CM2
AVA (VTI): 0.95 CM2
EJECTION FRACTION APICAL 4 CHAMBER: 42.7
EJECTION FRACTION: 63 %
LEFT ATRIUM VOLUME AREA LENGTH INDEX BSA: 18.2 ML/M2
LEFT VENTRICLE INTERNAL DIMENSION DIASTOLE: 4.06 CM (ref 3.5–6)
LEFT VENTRICULAR OUTFLOW TRACT DIAMETER: 2.05 CM
MITRAL VALVE E/A RATIO: 0.74
MITRAL VALVE E/E' RATIO: 9.63
RIGHT VENTRICLE FREE WALL PEAK S': 12.79 CM/S

## 2025-07-01 PROCEDURE — 93306 TTE W/DOPPLER COMPLETE: CPT | Performed by: INTERNAL MEDICINE

## 2025-07-01 PROCEDURE — 93306 TTE W/DOPPLER COMPLETE: CPT

## 2025-07-07 ENCOUNTER — APPOINTMENT (OUTPATIENT)
Facility: CLINIC | Age: 75
End: 2025-07-07
Payer: MEDICARE

## 2025-08-08 PROBLEM — I35.0 NONRHEUMATIC AORTIC (VALVE) STENOSIS: Status: ACTIVE | Noted: 2025-08-08

## 2025-08-12 ENCOUNTER — OFFICE VISIT (OUTPATIENT)
Facility: CLINIC | Age: 75
End: 2025-08-12
Payer: MEDICARE

## 2025-08-12 VITALS
HEART RATE: 68 BPM | BODY MASS INDEX: 26.26 KG/M2 | DIASTOLIC BLOOD PRESSURE: 64 MMHG | WEIGHT: 130 LBS | OXYGEN SATURATION: 96 % | SYSTOLIC BLOOD PRESSURE: 104 MMHG

## 2025-08-12 DIAGNOSIS — I35.0 NONRHEUMATIC AORTIC (VALVE) STENOSIS: Primary | ICD-10-CM

## 2025-08-12 DIAGNOSIS — Z01.810 PREOP CARDIOVASCULAR EXAM: ICD-10-CM

## 2025-08-12 DIAGNOSIS — E78.2 MIXED HYPERLIPIDEMIA: ICD-10-CM

## 2025-08-12 LAB
ATRIAL RATE: 75 BPM
P AXIS: 38 DEGREES
P OFFSET: 190 MS
P ONSET: 147 MS
PR INTERVAL: 158 MS
Q ONSET: 226 MS
QRS COUNT: 12 BEATS
QRS DURATION: 84 MS
QT INTERVAL: 346 MS
QTC CALCULATION(BAZETT): 386 MS
QTC FREDERICIA: 372 MS
R AXIS: -2 DEGREES
T AXIS: -1 DEGREES
T OFFSET: 399 MS
VENTRICULAR RATE: 75 BPM

## 2025-08-12 PROCEDURE — 93005 ELECTROCARDIOGRAM TRACING: CPT | Performed by: INTERNAL MEDICINE

## 2025-08-12 PROCEDURE — 99212 OFFICE O/P EST SF 10 MIN: CPT | Mod: 25

## 2025-08-12 PROCEDURE — G2211 COMPLEX E/M VISIT ADD ON: HCPCS | Performed by: INTERNAL MEDICINE

## 2025-08-12 PROCEDURE — 93010 ELECTROCARDIOGRAM REPORT: CPT | Performed by: INTERNAL MEDICINE

## 2025-08-12 PROCEDURE — 1159F MED LIST DOCD IN RCRD: CPT | Performed by: INTERNAL MEDICINE

## 2025-08-12 PROCEDURE — 99214 OFFICE O/P EST MOD 30 MIN: CPT | Performed by: INTERNAL MEDICINE

## 2025-08-12 PROCEDURE — 1126F AMNT PAIN NOTED NONE PRSNT: CPT | Performed by: INTERNAL MEDICINE

## 2025-08-12 ASSESSMENT — ENCOUNTER SYMPTOMS
PARESTHESIAS: 0
DYSPNEA ON EXERTION: 0
DYSURIA: 0
DEPRESSION: 0
PALPITATIONS: 0
NUMBNESS: 0
SHORTNESS OF BREATH: 0
HEMATURIA: 0
BLURRED VISION: 0
OCCASIONAL FEELINGS OF UNSTEADINESS: 0
ABDOMINAL PAIN: 0
COUGH: 0
LOSS OF SENSATION IN FEET: 0

## 2025-08-12 ASSESSMENT — LIFESTYLE VARIABLES: TOTAL SCORE: 0

## 2025-08-12 ASSESSMENT — PAIN SCALES - GENERAL: PAINLEVEL_OUTOF10: 0-NO PAIN

## 2025-08-20 ENCOUNTER — PATIENT OUTREACH (OUTPATIENT)
Dept: PRIMARY CARE | Facility: CLINIC | Age: 75
End: 2025-08-20
Payer: MEDICARE

## 2025-09-03 ENCOUNTER — PATIENT OUTREACH (OUTPATIENT)
Dept: PRIMARY CARE | Facility: CLINIC | Age: 75
End: 2025-09-03
Payer: MEDICARE